# Patient Record
Sex: FEMALE | Race: WHITE | NOT HISPANIC OR LATINO | Employment: FULL TIME | RURAL
[De-identification: names, ages, dates, MRNs, and addresses within clinical notes are randomized per-mention and may not be internally consistent; named-entity substitution may affect disease eponyms.]

---

## 2020-10-08 ENCOUNTER — OFFICE VISIT (OUTPATIENT)
Dept: FAMILY MEDICINE CLINIC | Facility: CLINIC | Age: 40
End: 2020-10-08

## 2020-10-08 VITALS
DIASTOLIC BLOOD PRESSURE: 74 MMHG | HEART RATE: 72 BPM | RESPIRATION RATE: 18 BRPM | TEMPERATURE: 98.1 F | OXYGEN SATURATION: 100 % | SYSTOLIC BLOOD PRESSURE: 121 MMHG | WEIGHT: 163 LBS | HEIGHT: 65 IN | BODY MASS INDEX: 27.16 KG/M2

## 2020-10-08 DIAGNOSIS — E53.8 VITAMIN B12 DEFICIENCY: ICD-10-CM

## 2020-10-08 DIAGNOSIS — Z13.29 SCREENING FOR HYPOTHYROIDISM: ICD-10-CM

## 2020-10-08 DIAGNOSIS — Z30.09 ENCOUNTER FOR COUNSELING REGARDING CONTRACEPTION: ICD-10-CM

## 2020-10-08 DIAGNOSIS — E66.9 OBESITY WITHOUT SERIOUS COMORBIDITY, UNSPECIFIED CLASSIFICATION, UNSPECIFIED OBESITY TYPE: ICD-10-CM

## 2020-10-08 DIAGNOSIS — E55.9 VITAMIN D DEFICIENCY: ICD-10-CM

## 2020-10-08 DIAGNOSIS — Z13.220 SCREENING FOR HYPERLIPIDEMIA: ICD-10-CM

## 2020-10-08 DIAGNOSIS — K64.8 OTHER HEMORRHOIDS: Primary | ICD-10-CM

## 2020-10-08 DIAGNOSIS — N39.45 CONTINUOUS LEAKAGE OF URINE: ICD-10-CM

## 2020-10-08 PROCEDURE — 99204 OFFICE O/P NEW MOD 45 MIN: CPT | Performed by: FAMILY MEDICINE

## 2020-10-08 RX ORDER — OXYBUTYNIN CHLORIDE 5 MG/1
5 TABLET ORAL 2 TIMES DAILY
Qty: 60 TABLET | Refills: 2 | Status: SHIPPED | OUTPATIENT
Start: 2020-10-08 | End: 2021-03-01

## 2020-10-08 RX ORDER — HYDROCORTISONE ACETATE PRAMOXINE HCL 2.5; 1 G/100G; G/100G
CREAM TOPICAL 3 TIMES DAILY
Qty: 1 EACH | Refills: 0 | Status: SHIPPED | OUTPATIENT
Start: 2020-10-08 | End: 2020-10-19 | Stop reason: SDUPTHER

## 2020-10-08 NOTE — ASSESSMENT & PLAN NOTE
Patient was advised to try sitz bath and Hydrocort-pramoxine.  Patient was advised to return to clinic if her symptoms do not improve, consider colorectal surgery referral.

## 2020-10-08 NOTE — ASSESSMENT & PLAN NOTE
Obesity is worsening.  Discussed the patient's BMI.  The BMI is above average; BMI management plan is completed.  General weight loss/lifestyle modification strategies discussed (elicit support from others; identify saboteurs; non-food rewards, etc).  Regular aerobic exercise program discussed.

## 2020-10-08 NOTE — PROGRESS NOTES
Chief Complaint   Patient presents with   • Establish Care     New Patient   • Obesity     Discuss Gym Membership   • Contraception     Mirena- 6 Years Old       History of Present Illness:  Subjective   Ermelinda Zhang is a 40 y.o. female.   History of Present Illness   Establish Care:   Patient is here today to establish care, her last PCP visit was over 2 years ago. She currently takes no medications, has no documented health issues in her chart.     Obesity:   Patient is concerned about her weight, she is able to get a 6 month gym membership through her insurance and would like to do this. Her current BMI is 27.55. Her goal weight is 145-150 pounds. She feels that if she joins the gym and gets into a routine, she would be able to get her weight down which she feels would help with her energy and overall mood. Patient is wondering if she can still do ab workouts even if she has an umbilical hernia.     Contraception:   Patient has a Mirena IUD that was placed about 6 years ago. She is ready to have this removed and wants to talk about options for birth control. She knows she can't remember to take a pill everyday, she doesn't want the shot due to weight gain. She is trying to convince her  to have a vasectomy but he is resistant to that idea. Therefore, she would liek to discuss other options.     Hemorrhoids:   Patient states that she has had inflamed hemorrhoids for over 6 years that flare up on occasion and cause her to have a lot of bloody stool and cause pain and discomfort. She wanted it to be mentioned so that she had it documented in her medical chart.     Schedule Physical:   Patient would like to return to the office for a physical examination including a pap, mammogram and to have labs drawn today in order to review at the physical. She hasn't ever had a mammogram- her last pap was 6 years ago when her IUD was placed.     Allergies:  No Known Allergies    Social History:  Social History      Socioeconomic History   • Marital status:      Spouse name: Not on file   • Number of children: Not on file   • Years of education: Not on file   • Highest education level: Not on file       Family History:  No family history on file.    Past Medical History :  Active Ambulatory Problems     Diagnosis Date Noted   • Continuous leakage of urine 10/08/2020   • Encounter for counseling regarding contraception 10/08/2020   • Other hemorrhoids 10/08/2020   • Obesity without serious comorbidity 10/08/2020     Resolved Ambulatory Problems     Diagnosis Date Noted   • No Resolved Ambulatory Problems     No Additional Past Medical History       Medication List:  Outpatient Encounter Medications as of 10/8/2020   Medication Sig Dispense Refill   • Hydrocort-Pramoxine, Perianal, (ANALPRAM-HC) 2.5-1 % rectal cream Insert  into the rectum 3 (Three) Times a Day. 2-4 times daily, as needed or after each Bowel Movement 1 each 0   • oxybutynin (DITROPAN) 5 MG tablet Take 1 tablet by mouth 2 (Two) Times a Day. 60 tablet 2     No facility-administered encounter medications on file as of 10/8/2020.        Past Surgical History:  No past surgical history on file.     The following portions of the patient's history were reviewed and updated as appropriate: allergies, current medications, past family history, past medical history, past social history, past surgical history and problem list.    Review Of Systems:  Review of Systems   Constitutional: Negative for activity change, appetite change and fatigue.   HENT: Negative for ear discharge, ear pain, postnasal drip and rhinorrhea.    Eyes: Negative for double vision and discharge.   Respiratory: Negative for cough, chest tightness and shortness of breath.    Cardiovascular: Negative for chest pain.   Endocrine: Negative for cold intolerance and heat intolerance.   Genitourinary: Positive for urinary incontinence.        Hemorrhoids.   Musculoskeletal: Negative for back pain,  "gait problem and joint swelling.   Skin: Negative for color change and rash.   Neurological: Negative for dizziness, facial asymmetry and confusion.   Psychiatric/Behavioral: Negative for behavioral problems.       Objective     Physical Exam:  Vital Signs:  Visit Vitals  /74   Pulse 72   Temp 98.1 °F (36.7 °C)   Resp 18   Ht 163.8 cm (64.5\")   Wt 73.9 kg (163 lb)   SpO2 100%   BMI 27.55 kg/m²       Physical Exam  Vitals signs reviewed.   Constitutional:       Appearance: She is well-developed.   HENT:      Head: Normocephalic.      Right Ear: External ear normal.      Left Ear: External ear normal.      Nose: Nose normal.   Eyes:      Conjunctiva/sclera: Conjunctivae normal.   Neck:      Musculoskeletal: Normal range of motion and neck supple.   Cardiovascular:      Rate and Rhythm: Normal rate and regular rhythm.   Pulmonary:      Effort: Pulmonary effort is normal.      Breath sounds: Normal breath sounds.   Musculoskeletal: Normal range of motion.   Skin:     General: Skin is warm and dry.      Capillary Refill: Capillary refill takes less than 2 seconds.   Neurological:      Mental Status: She is alert and oriented to person, place, and time.           Assessment/Plan   Assessment and Plan:  Diagnoses and all orders for this visit:    1. Other hemorrhoids (Primary)  Assessment & Plan:  Patient was advised to try sitz bath and Hydrocort-pramoxine.  Patient was advised to return to clinic if her symptoms do not improve, consider colorectal surgery referral.    Orders:  -     Hydrocort-Pramoxine, Perianal, (ANALPRAM-HC) 2.5-1 % rectal cream; Insert  into the rectum 3 (Three) Times a Day. 2-4 times daily, as needed or after each Bowel Movement  Dispense: 1 each; Refill: 0    2. Continuous leakage of urine  -     oxybutynin (DITROPAN) 5 MG tablet; Take 1 tablet by mouth 2 (Two) Times a Day.  Dispense: 60 tablet; Refill: 2    3. Obesity without serious comorbidity, unspecified classification, unspecified " obesity type  Assessment & Plan:  Obesity is worsening.  Discussed the patient's BMI.  The BMI is above average; BMI management plan is completed.  General weight loss/lifestyle modification strategies discussed (elicit support from others; identify saboteurs; non-food rewards, etc).  Regular aerobic exercise program discussed.        4. Vitamin D deficiency  -     Vitamin D 25 Hydroxy  -     Folate    5. Vitamin B12 deficiency  -     Vitamin B12    6. Screening for hypothyroidism  -     TSH    7. Screening for hyperlipidemia  -     CBC & Differential  -     Comprehensive Metabolic Panel  -     Lipid Panel With / Chol / HDL Ratio    8. Encounter for counseling regarding contraception

## 2020-10-13 LAB
25(OH)D3+25(OH)D2 SERPL-MCNC: 38.9 NG/ML (ref 30–100)
ALBUMIN SERPL-MCNC: 5.1 G/DL (ref 3.8–4.8)
ALBUMIN/GLOB SERPL: 2.4 {RATIO} (ref 1.2–2.2)
ALP SERPL-CCNC: 41 IU/L (ref 39–117)
ALT SERPL-CCNC: 43 IU/L (ref 0–32)
AST SERPL-CCNC: 27 IU/L (ref 0–40)
BASOPHILS # BLD AUTO: 0 X10E3/UL (ref 0–0.2)
BASOPHILS NFR BLD AUTO: 1 %
BILIRUB SERPL-MCNC: 0.4 MG/DL (ref 0–1.2)
BUN SERPL-MCNC: 9 MG/DL (ref 6–24)
BUN/CREAT SERPL: 13 (ref 9–23)
CALCIUM SERPL-MCNC: 9.4 MG/DL (ref 8.7–10.2)
CHLORIDE SERPL-SCNC: 104 MMOL/L (ref 96–106)
CHOLEST SERPL-MCNC: 157 MG/DL (ref 100–199)
CHOLEST/HDLC SERPL: 2.3 RATIO (ref 0–4.4)
CO2 SERPL-SCNC: 25 MMOL/L (ref 20–29)
CREAT SERPL-MCNC: 0.72 MG/DL (ref 0.57–1)
EOSINOPHIL # BLD AUTO: 0 X10E3/UL (ref 0–0.4)
EOSINOPHIL NFR BLD AUTO: 0 %
ERYTHROCYTE [DISTWIDTH] IN BLOOD BY AUTOMATED COUNT: 12.2 % (ref 11.7–15.4)
FOLATE SERPL-MCNC: >20 NG/ML
GLOBULIN SER CALC-MCNC: 2.1 G/DL (ref 1.5–4.5)
GLUCOSE SERPL-MCNC: 100 MG/DL (ref 65–99)
HCT VFR BLD AUTO: 44.9 % (ref 34–46.6)
HDLC SERPL-MCNC: 69 MG/DL
HGB BLD-MCNC: 15 G/DL (ref 11.1–15.9)
IMM GRANULOCYTES # BLD AUTO: 0 X10E3/UL (ref 0–0.1)
IMM GRANULOCYTES NFR BLD AUTO: 0 %
LDLC SERPL CALC-MCNC: 77 MG/DL (ref 0–99)
LYMPHOCYTES # BLD AUTO: 2.2 X10E3/UL (ref 0.7–3.1)
LYMPHOCYTES NFR BLD AUTO: 30 %
MCH RBC QN AUTO: 31.6 PG (ref 26.6–33)
MCHC RBC AUTO-ENTMCNC: 33.4 G/DL (ref 31.5–35.7)
MCV RBC AUTO: 95 FL (ref 79–97)
MONOCYTES # BLD AUTO: 0.5 X10E3/UL (ref 0.1–0.9)
MONOCYTES NFR BLD AUTO: 6 %
NEUTROPHILS # BLD AUTO: 4.7 X10E3/UL (ref 1.4–7)
NEUTROPHILS NFR BLD AUTO: 63 %
PLATELET # BLD AUTO: 307 X10E3/UL (ref 150–450)
POTASSIUM SERPL-SCNC: 4.4 MMOL/L (ref 3.5–5.2)
PROT SERPL-MCNC: 7.2 G/DL (ref 6–8.5)
RBC # BLD AUTO: 4.74 X10E6/UL (ref 3.77–5.28)
SODIUM SERPL-SCNC: 141 MMOL/L (ref 134–144)
TRIGL SERPL-MCNC: 50 MG/DL (ref 0–149)
TSH SERPL DL<=0.005 MIU/L-ACNC: 2.25 UIU/ML (ref 0.45–4.5)
VIT B12 SERPL-MCNC: 486 PG/ML (ref 232–1245)
VLDLC SERPL CALC-MCNC: 11 MG/DL (ref 5–40)
WBC # BLD AUTO: 7.5 X10E3/UL (ref 3.4–10.8)

## 2020-10-19 ENCOUNTER — OFFICE VISIT (OUTPATIENT)
Dept: FAMILY MEDICINE CLINIC | Facility: CLINIC | Age: 40
End: 2020-10-19

## 2020-10-19 VITALS
HEART RATE: 66 BPM | DIASTOLIC BLOOD PRESSURE: 81 MMHG | WEIGHT: 164.4 LBS | TEMPERATURE: 97.5 F | HEIGHT: 65 IN | BODY MASS INDEX: 27.39 KG/M2 | OXYGEN SATURATION: 98 % | RESPIRATION RATE: 18 BRPM | SYSTOLIC BLOOD PRESSURE: 125 MMHG

## 2020-10-19 DIAGNOSIS — Z00.00 PHYSICAL EXAM: Primary | ICD-10-CM

## 2020-10-19 DIAGNOSIS — Z12.31 ENCOUNTER FOR SCREENING MAMMOGRAM FOR BREAST CANCER: ICD-10-CM

## 2020-10-19 DIAGNOSIS — K64.8 OTHER HEMORRHOIDS: ICD-10-CM

## 2020-10-19 PROCEDURE — 99396 PREV VISIT EST AGE 40-64: CPT | Performed by: FAMILY MEDICINE

## 2020-10-19 RX ORDER — HYDROCORTISONE ACETATE PRAMOXINE HCL 2.5; 1 G/100G; G/100G
CREAM TOPICAL 3 TIMES DAILY
Qty: 1 EACH | Refills: 0 | Status: SHIPPED | OUTPATIENT
Start: 2020-10-19 | End: 2021-03-01

## 2020-10-19 NOTE — ASSESSMENT & PLAN NOTE
Discussed injury prevention, diet and exercise, safe sexual practices, and screening for common diseases. Encouraged use of sunscreen and seatbelts. Discussed timing of  cervical cancer screening. Encouraged monthly self-breast exams, yearly clinical breast exams, and discussed timing of mammograms. Avoidance of tobacco encouraged. Limitation or avoidance of alcohol encouraged. Recommend yearly dental and eye exams. Also discussed monitoring of blood pressure, lipids.

## 2020-10-19 NOTE — PROGRESS NOTES
Chief Complaint   Patient presents with   • Annual Exam       History of Present Illness:  Subjective    History of Present Illness   Ermelinda Zhang is a 40 y.o. female here for her annual physical with me. Ermelinda is here for coordination of medical care, to discuss health maintenance, disease prevention as well as to followup on medical problems. Patient has been followed by me 2020. Patient's last CPE was unknown. Activity level is moderate. Exercises 4 per week. Appetite is good. Feels well with few complaints. Energy level is fair and poor. Sleeps poorly.Patient has never had a mammogram but she states that she is wanting to get one scheduled. Patient is doing routine self skin exam monthly. Patient is doing routine self-breast exams not very often.     Allergies:  No Known Allergies    Social History:  Social History     Socioeconomic History   • Marital status:      Spouse name: Not on file   • Number of children: Not on file   • Years of education: Not on file   • Highest education level: Not on file       Family History:  History reviewed. No pertinent family history.    Past Medical History :  Active Ambulatory Problems     Diagnosis Date Noted   • Continuous leakage of urine 10/08/2020   • Encounter for counseling regarding contraception 10/08/2020   • Other hemorrhoids 10/08/2020   • Obesity without serious comorbidity 10/08/2020   • Physical exam 10/19/2020     Resolved Ambulatory Problems     Diagnosis Date Noted   • No Resolved Ambulatory Problems     No Additional Past Medical History       Medication List:  Outpatient Encounter Medications as of 10/19/2020   Medication Sig Dispense Refill   • Hydrocort-Pramoxine, Perianal, (ANALPRAM-HC) 2.5-1 % rectal cream Insert  into the rectum 3 (Three) Times a Day. 2-4 times daily, as needed or after each Bowel Movement 1 each 0   • oxybutynin (DITROPAN) 5 MG tablet Take 1 tablet by mouth 2 (Two) Times a Day. 60 tablet 2   • [DISCONTINUED]  "Hydrocort-Pramoxine, Perianal, (ANALPRAM-HC) 2.5-1 % rectal cream Insert  into the rectum 3 (Three) Times a Day. 2-4 times daily, as needed or after each Bowel Movement 1 each 0     No facility-administered encounter medications on file as of 10/19/2020.        Past Surgical History:  History reviewed. No pertinent surgical history.     The following portions of the patient's history were reviewed and updated as appropriate: allergies, current medications, past family history, past medical history, past social history, past surgical history and problem list.    Review Of Systems:  Review of Systems   Constitutional: Negative for activity change, appetite change and fatigue.   HENT: Negative for congestion, postnasal drip, sinus pressure and sore throat.    Eyes: Negative for blurred vision and itching.   Respiratory: Negative for cough, shortness of breath and wheezing.    Cardiovascular: Negative for chest pain.   Gastrointestinal: Negative for abdominal pain, constipation, nausea, vomiting and GERD.   Endocrine: Negative for cold intolerance and heat intolerance.   Genitourinary: Negative for difficulty urinating, dysuria and urinary incontinence.   Musculoskeletal: Negative for back pain, joint swelling and neck pain.   Skin: Negative for color change and rash.   Neurological: Negative for dizziness, speech difficulty, weakness and memory problem.   Psychiatric/Behavioral: Negative for behavioral problems, decreased concentration, suicidal ideas and depressed mood.       Objective     Physical Exam:  Vital Signs:  Visit Vitals  /81   Pulse 66   Temp 97.5 °F (36.4 °C)   Resp 18   Ht 163.8 cm (64.5\")   Wt 74.6 kg (164 lb 6.4 oz)   SpO2 98%   BMI 27.78 kg/m²       Physical Exam  Vitals signs reviewed. Exam conducted with a chaperone present.   Constitutional:       Appearance: She is well-developed.   HENT:      Head: Normocephalic and atraumatic.      Nose: Nose normal.   Eyes:      General: Lids are normal. "      Conjunctiva/sclera: Conjunctivae normal.      Pupils: Pupils are equal, round, and reactive to light.   Neck:      Musculoskeletal: Normal range of motion and neck supple.   Cardiovascular:      Rate and Rhythm: Normal rate and regular rhythm.      Pulses: Normal pulses.      Heart sounds: Normal heart sounds.   Pulmonary:      Effort: Pulmonary effort is normal. No respiratory distress.      Breath sounds: Normal breath sounds.   Chest:      Chest wall: No swelling or tenderness.      Breasts: Boone Score is 5.         Right: Normal. No inverted nipple, mass or nipple discharge.         Left: No inverted nipple, mass or nipple discharge.   Abdominal:      General: Bowel sounds are normal.      Palpations: Abdomen is soft.   Musculoskeletal: Normal range of motion.   Skin:     General: Skin is warm and dry.      Capillary Refill: Capillary refill takes less than 2 seconds.   Neurological:      Mental Status: She is alert and oriented to person, place, and time.   Psychiatric:         Behavior: Behavior normal.         Thought Content: Thought content normal.         Judgment: Judgment normal.           Assessment/Plan    Recent Results (from the past 1344 hour(s))   CBC & Differential    Collection Time: 10/12/20  9:12 AM    Specimen: Blood   Result Value Ref Range    WBC 7.5 3.4 - 10.8 x10E3/uL    RBC 4.74 3.77 - 5.28 x10E6/uL    Hemoglobin 15.0 11.1 - 15.9 g/dL    Hematocrit 44.9 34.0 - 46.6 %    MCV 95 79 - 97 fL    MCH 31.6 26.6 - 33.0 pg    MCHC 33.4 31.5 - 35.7 g/dL    RDW 12.2 11.7 - 15.4 %    Platelets 307 150 - 450 x10E3/uL    Neutrophil Rel % 63 Not Estab. %    Lymphocyte Rel % 30 Not Estab. %    Monocyte Rel % 6 Not Estab. %    Eosinophil Rel % 0 Not Estab. %    Basophil Rel % 1 Not Estab. %    Neutrophils Absolute 4.7 1.4 - 7.0 x10E3/uL    Lymphocytes Absolute 2.2 0.7 - 3.1 x10E3/uL    Monocytes Absolute 0.5 0.1 - 0.9 x10E3/uL    Eosinophils Absolute 0.0 0.0 - 0.4 x10E3/uL    Basophils Absolute 0.0  0.0 - 0.2 x10E3/uL    Immature Granulocyte Rel % 0 Not Estab. %    Immature Grans Absolute 0.0 0.0 - 0.1 x10E3/uL   Comprehensive Metabolic Panel    Collection Time: 10/12/20  9:12 AM    Specimen: Blood   Result Value Ref Range    Glucose 100 (H) 65 - 99 mg/dL    BUN 9 6 - 24 mg/dL    Creatinine 0.72 0.57 - 1.00 mg/dL    eGFR Non African Am 104 >59 mL/min/1.73    eGFR African Am 119 >59 mL/min/1.73    BUN/Creatinine Ratio 13 9 - 23    Sodium 141 134 - 144 mmol/L    Potassium 4.4 3.5 - 5.2 mmol/L    Chloride 104 96 - 106 mmol/L    Total CO2 25 20 - 29 mmol/L    Calcium 9.4 8.7 - 10.2 mg/dL    Total Protein 7.2 6.0 - 8.5 g/dL    Albumin 5.1 (H) 3.8 - 4.8 g/dL    Globulin 2.1 1.5 - 4.5 g/dL    A/G Ratio 2.4 (H) 1.2 - 2.2    Total Bilirubin 0.4 0.0 - 1.2 mg/dL    Alkaline Phosphatase 41 39 - 117 IU/L    AST (SGOT) 27 0 - 40 IU/L    ALT (SGPT) 43 (H) 0 - 32 IU/L   Lipid Panel With / Chol / HDL Ratio    Collection Time: 10/12/20  9:12 AM    Specimen: Blood   Result Value Ref Range    Total Cholesterol 157 100 - 199 mg/dL    Triglycerides 50 0 - 149 mg/dL    HDL Cholesterol 69 >39 mg/dL    VLDL Cholesterol Humberto 11 5 - 40 mg/dL    LDL Chol Calc (NIH) 77 0 - 99 mg/dL    Chol/HDL Ratio 2.3 0.0 - 4.4 ratio   TSH    Collection Time: 10/12/20  9:12 AM    Specimen: Blood   Result Value Ref Range    TSH 2.250 0.450 - 4.500 uIU/mL   Vitamin D 25 Hydroxy    Collection Time: 10/12/20  9:12 AM    Specimen: Blood   Result Value Ref Range    25 Hydroxy, Vitamin D 38.9 30.0 - 100.0 ng/mL   Vitamin B12    Collection Time: 10/12/20  9:12 AM    Specimen: Blood   Result Value Ref Range    Vitamin B-12 486 232 - 1,245 pg/mL   Folate    Collection Time: 10/12/20  9:12 AM    Specimen: Blood   Result Value Ref Range    Folate >20.0 >3.0 ng/mL     Assessment and Plan:  Diagnoses and all orders for this visit:    1. Physical exam (Primary)  Assessment & Plan:  Discussed injury prevention, diet and exercise, safe sexual practices, and screening for  common diseases. Encouraged use of sunscreen and seatbelts. Discussed timing of  cervical cancer screening. Encouraged monthly self-breast exams, yearly clinical breast exams, and discussed timing of mammograms. Avoidance of tobacco encouraged. Limitation or avoidance of alcohol encouraged. Recommend yearly dental and eye exams. Also discussed monitoring of blood pressure, lipids.      2. Encounter for screening mammogram for breast cancer  -     Mammo Screening Digital Tomosynthesis Bilateral With CAD; Future    3. Other hemorrhoids  -     Hydrocort-Pramoxine, Perianal, (ANALPRAM-HC) 2.5-1 % rectal cream; Insert  into the rectum 3 (Three) Times a Day. 2-4 times daily, as needed or after each Bowel Movement  Dispense: 1 each; Refill: 0

## 2020-11-09 ENCOUNTER — HOSPITAL ENCOUNTER (OUTPATIENT)
Dept: MAMMOGRAPHY | Facility: HOSPITAL | Age: 40
Discharge: HOME | End: 2020-11-09
Admitting: FAMILY MEDICINE

## 2020-11-09 DIAGNOSIS — Z12.31 ENCOUNTER FOR SCREENING MAMMOGRAM FOR BREAST CANCER: ICD-10-CM

## 2020-11-09 PROCEDURE — 77067 SCR MAMMO BI INCL CAD: CPT

## 2020-11-09 PROCEDURE — 77063 BREAST TOMOSYNTHESIS BI: CPT

## 2020-11-13 ENCOUNTER — HOSPITAL ENCOUNTER (OUTPATIENT)
Dept: MAMMOGRAPHY | Facility: HOSPITAL | Age: 40
Discharge: HOME | End: 2020-11-13
Admitting: FAMILY MEDICINE

## 2020-11-13 DIAGNOSIS — R92.8 ABNORMALITY OF LEFT BREAST ON SCREENING MAMMOGRAM: ICD-10-CM

## 2020-11-13 PROCEDURE — G0279 TOMOSYNTHESIS, MAMMO: HCPCS

## 2020-11-13 PROCEDURE — 77065 DX MAMMO INCL CAD UNI: CPT

## 2020-11-16 ENCOUNTER — APPOINTMENT (OUTPATIENT)
Dept: MAMMOGRAPHY | Facility: HOSPITAL | Age: 40
End: 2020-11-16

## 2021-03-01 ENCOUNTER — OFFICE VISIT (OUTPATIENT)
Dept: FAMILY MEDICINE CLINIC | Facility: CLINIC | Age: 41
End: 2021-03-01

## 2021-03-01 VITALS
OXYGEN SATURATION: 100 % | SYSTOLIC BLOOD PRESSURE: 141 MMHG | HEIGHT: 65 IN | DIASTOLIC BLOOD PRESSURE: 98 MMHG | HEART RATE: 85 BPM | WEIGHT: 165 LBS | TEMPERATURE: 98 F | RESPIRATION RATE: 18 BRPM | BODY MASS INDEX: 27.49 KG/M2

## 2021-03-01 DIAGNOSIS — F32.A ANXIETY AND DEPRESSION: Primary | ICD-10-CM

## 2021-03-01 DIAGNOSIS — Z30.09 ENCOUNTER FOR COUNSELING REGARDING CONTRACEPTION: ICD-10-CM

## 2021-03-01 DIAGNOSIS — F41.9 ANXIETY AND DEPRESSION: Primary | ICD-10-CM

## 2021-03-01 PROCEDURE — 99214 OFFICE O/P EST MOD 30 MIN: CPT | Performed by: FAMILY MEDICINE

## 2021-03-01 RX ORDER — NORETHINDRONE ACETATE AND ETHINYL ESTRADIOL 1; .02 MG/1; MG/1
1 TABLET ORAL DAILY
Qty: 28 TABLET | Refills: 12 | Status: SHIPPED | OUTPATIENT
Start: 2021-03-01 | End: 2021-12-10

## 2021-03-01 NOTE — PROGRESS NOTES
"Chief Complaint  Anxiety (due to death in the family) and Contraception (discuss options)    Subjective    History of Present Illness        Ermelinda Zhang presents to [facility] for   History of Present Illness   Anxiety:   Patient is here today to discuss her anxiety and depression. She recently lost her mother and doesn't have many answers to go off of as to why. She is aware her mother had been using illicit substances again but the  ruled her death natural causes. She is starting therapy on Monday @ noon.     Birth Control Options:   Patient currently had an IUD and she has had this for 7 years. She doesn't really want to get it  Replaced because her periods are  irratic and sometimes she has 2-3 periods a month or sometimes she will skip altogether.     Objective   Vital Signs:   Visit Vitals  /98   Pulse 85   Temp 98 °F (36.7 °C)   Resp 18   Ht 163.8 cm (64.5\")   Wt 74.8 kg (165 lb)   SpO2 100%   BMI 27.88 kg/m²       Physical Exam  Vitals signs reviewed.   Constitutional:       Appearance: She is well-developed.   HENT:      Head: Normocephalic.      Right Ear: External ear normal.      Left Ear: External ear normal.      Nose: Nose normal.   Eyes:      Conjunctiva/sclera: Conjunctivae normal.   Neck:      Musculoskeletal: Normal range of motion and neck supple.   Cardiovascular:      Rate and Rhythm: Normal rate and regular rhythm.   Pulmonary:      Effort: Pulmonary effort is normal.      Breath sounds: Normal breath sounds.   Musculoskeletal: Normal range of motion.   Skin:     General: Skin is warm and dry.      Capillary Refill: Capillary refill takes less than 2 seconds.   Neurological:      Mental Status: She is alert and oriented to person, place, and time.            Result Review :                    Assessment and Plan      Diagnoses and all orders for this visit:    1. Anxiety and depression (Primary)  Assessment & Plan:  Patient symptoms are " worsening.  Discussed medications to help treat symptoms but patient declined.  Patient was advised to monitor symptoms and if they worsen return to the clinic.  We will consider Wellbutrin to treat her symptoms.      2. Encounter for counseling regarding contraception  Assessment & Plan:  Patient was started on Loestrin for birth control.  Patient was encouraged to return the clinic if she has any problems with this medication.    Orders:  -     norethindrone-ethinyl estradiol (Loestrin 1/20, 21,) 1-20 MG-MCG per tablet; Take 1 tablet by mouth Daily.  Dispense: 28 tablet; Refill: 12           Follow Up   No follow-ups on file.  Patient was given instructions and counseling regarding her condition or for health maintenance advice. Please see specific information pulled into the AVS if appropriate.

## 2021-03-03 PROBLEM — F41.9 ANXIETY AND DEPRESSION: Status: ACTIVE | Noted: 2021-03-03

## 2021-03-03 PROBLEM — F32.A ANXIETY AND DEPRESSION: Status: ACTIVE | Noted: 2021-03-03

## 2021-03-04 NOTE — ASSESSMENT & PLAN NOTE
Patient symptoms are worsening.  Discussed medications to help treat symptoms but patient declined.  Patient was advised to monitor symptoms and if they worsen return to the clinic.  We will consider Wellbutrin to treat her symptoms.

## 2021-03-04 NOTE — ASSESSMENT & PLAN NOTE
Patient was started on Loestrin for birth control.  Patient was encouraged to return the clinic if she has any problems with this medication.

## 2021-03-16 ENCOUNTER — TELEPHONE (OUTPATIENT)
Dept: FAMILY MEDICINE CLINIC | Facility: CLINIC | Age: 41
End: 2021-03-16

## 2021-03-16 NOTE — TELEPHONE ENCOUNTER
PATIENT CALLED AND STATED THAT SHE HAS BEEN HAVING ELEVATED BLOOD PRESSURE, RED EARS, AND HEADACHES. TODAY BLOOD PRESSURE /76. THE PATIENT STATED THAT OVER THE PAST TWO DAYS SHE WAS TAKING HER BLOOD PRESSURE WITH A WRIST CUFF AND THE BOTTOM NUMBER WAS ELEVATED FROM 90'S - 100. TODAY'S BP WAS TAKE WITH A NEW ARM CUFF MONITOR.    THE PATIENT ALSO STATED THAT SHE HAS STARTED DRINKING GREEN SMOOTHIES WITH BEET POWDER, TRYING TO BRING BLOOD PRESSURE DOWN NATURALLY. THE PATIENT STATED THAT SHE HAS AN APPT SCHEDULED FOR 3/22, WITH DR. PABLO TO GET HER IUD TAKEN OUT, AND WANTED TO KNOW IF IT WOULD BE OKAY TO WAIT AND DISCUSS HER BP WITH HIM THEN, OR IF SHE NEEDS TO MAKE AN APPT TO COME IN SOONER.    THE PATIENT IS REQUESTING A CALLBACK:    [phone]

## 2021-04-21 ENCOUNTER — TELEPHONE (OUTPATIENT)
Dept: PEDIATRICS | Facility: OTHER | Age: 41
End: 2021-04-21

## 2021-05-24 ENCOUNTER — PROCEDURE VISIT (OUTPATIENT)
Dept: FAMILY MEDICINE CLINIC | Facility: CLINIC | Age: 41
End: 2021-05-24

## 2021-05-24 VITALS
SYSTOLIC BLOOD PRESSURE: 134 MMHG | BODY MASS INDEX: 27.96 KG/M2 | OXYGEN SATURATION: 98 % | HEIGHT: 65 IN | TEMPERATURE: 97 F | HEART RATE: 77 BPM | DIASTOLIC BLOOD PRESSURE: 89 MMHG | WEIGHT: 167.8 LBS | RESPIRATION RATE: 18 BRPM

## 2021-05-24 DIAGNOSIS — F41.1 ANXIETY IN ACUTE STRESS REACTION: ICD-10-CM

## 2021-05-24 DIAGNOSIS — Z30.09 COUNSELING FOR BIRTH CONTROL REGARDING INTRAUTERINE DEVICE (IUD): Primary | ICD-10-CM

## 2021-05-24 DIAGNOSIS — F43.0 ANXIETY IN ACUTE STRESS REACTION: ICD-10-CM

## 2021-05-24 PROCEDURE — 99213 OFFICE O/P EST LOW 20 MIN: CPT | Performed by: FAMILY MEDICINE

## 2021-05-24 RX ORDER — LORAZEPAM 1 MG/1
1 TABLET ORAL EVERY 8 HOURS PRN
Qty: 10 TABLET | Refills: 0 | Status: SHIPPED | OUTPATIENT
Start: 2021-05-24 | End: 2021-12-10

## 2021-05-24 RX ORDER — CLONAZEPAM 1 MG/1
1 TABLET ORAL
Qty: 5 TABLET | Refills: 0 | Status: CANCELLED | OUTPATIENT
Start: 2021-05-24

## 2021-05-24 NOTE — PROGRESS NOTES
Subjective   Ermelinda Zhang is a 40 y.o. female.   No chief complaint on file.    Patient comes in today to have IUD taken out, however patient stated that we have had to change her appt. And she is now spotting and wants IUD replaced, because her  has stated that he is not having a vasectomy.       The following portions of the patient's history were reviewed and updated as appropriate: allergies, current medications, past family history, past medical history, past social history, past surgical history and problem list.    History reviewed. No pertinent past medical history.    History reviewed. No pertinent surgical history.     Family History   Problem Relation Age of Onset   • Breast cancer Paternal Grandmother    • Ovarian cancer Neg Hx         Social History     Socioeconomic History   • Marital status:      Spouse name: Not on file   • Number of children: Not on file   • Years of education: Not on file   • Highest education level: Not on file   Tobacco Use   • Smoking status: Former Smoker     Quit date: 10/13/2015     Years since quittin.6   • Smokeless tobacco: Never Used   Substance and Sexual Activity   • Alcohol use: Never   • Drug use: Never   • Sexual activity: Yes     Partners: Male     Birth control/protection: I.U.D.         Review of Systems   Constitutional: Negative for activity change, appetite change and fatigue.   HENT: Negative for ear discharge, ear pain, postnasal drip and rhinorrhea.    Eyes: Negative for double vision and discharge.   Respiratory: Negative for cough, chest tightness and shortness of breath.    Cardiovascular: Negative for chest pain.   Endocrine: Negative for cold intolerance and heat intolerance.   Musculoskeletal: Negative for back pain, gait problem and joint swelling.   Skin: Negative for color change and rash.   Neurological: Negative for dizziness, facial asymmetry and confusion.   Psychiatric/Behavioral: Negative for behavioral problems.  "      Objective   Visit Vitals  /89 (BP Location: Right arm, Patient Position: Sitting, Cuff Size: Adult)   Pulse 77   Temp 97 °F (36.1 °C) (Temporal)   Resp 18   Ht 163.8 cm (64.5\")   Wt 76.1 kg (167 lb 12.8 oz)   SpO2 98%   BMI 28.36 kg/m²     Physical Exam  Vitals reviewed.   Constitutional:       Appearance: She is well-developed.   HENT:      Head: Normocephalic.      Right Ear: External ear normal.      Left Ear: External ear normal.      Nose: Nose normal.   Eyes:      Conjunctiva/sclera: Conjunctivae normal.   Cardiovascular:      Rate and Rhythm: Normal rate and regular rhythm.   Pulmonary:      Effort: Pulmonary effort is normal.      Breath sounds: Normal breath sounds.   Musculoskeletal:         General: Normal range of motion.      Cervical back: Normal range of motion and neck supple.   Skin:     General: Skin is warm and dry.      Capillary Refill: Capillary refill takes less than 2 seconds.   Neurological:      Mental Status: She is alert and oriented to person, place, and time.         Assessment/Plan   Problem List Items Addressed This Visit        Genitourinary and Reproductive     Counseling for birth control regarding intrauterine device (IUD) - Primary    Current Assessment & Plan     Patient is to have her IUD removed and replacement IUD placed.  Patient's IUD was ordered and was sent to his clinic and we will schedule her for her procedure            Mental Health    Anxiety in acute stress reaction    Current Assessment & Plan     Patient was prescribed Ativan to help with the acute stress of having the IUD placed.  Patient was advised to take the Ativan day of the procedure.         Relevant Medications    LORazepam (Ativan) 1 MG tablet                   "

## 2021-05-27 PROBLEM — F41.1 ANXIETY IN ACUTE STRESS REACTION: Status: ACTIVE | Noted: 2021-05-27

## 2021-05-27 PROBLEM — F43.0 ANXIETY IN ACUTE STRESS REACTION: Status: ACTIVE | Noted: 2021-05-27

## 2021-05-27 NOTE — ASSESSMENT & PLAN NOTE
Patient was prescribed Ativan to help with the acute stress of having the IUD placed.  Patient was advised to take the Ativan day of the procedure.

## 2021-05-27 NOTE — ASSESSMENT & PLAN NOTE
Patient is to have her IUD removed and replacement IUD placed.  Patient's IUD was ordered and was sent to his clinic and we will schedule her for her procedure

## 2021-07-07 ENCOUNTER — TELEPHONE (OUTPATIENT)
Dept: FAMILY MEDICINE CLINIC | Facility: CLINIC | Age: 41
End: 2021-07-07

## 2021-07-07 NOTE — TELEPHONE ENCOUNTER
Caller: Ermelinda Zhang    Relationship: Self    Best call back number: [phone]    What is the best time to reach you: ANYTIME - WOULD PREFER TEXT WHILE SHE IS AT WORK.     Who are you requesting to speak with (clinical staff, provider,  specific staff member): PCP OR MEDICAL ASSISTANT    What was the call regarding: THE PATIENT WOULD LIKE TO KNOW IF HER IUD IS IN.    Do you require a callback: YES, PLEASE

## 2021-07-12 NOTE — TELEPHONE ENCOUNTER
I have spoken to this patient and we have sent this order form as of today- I told her I wasn't sure if they would approve it or not.

## 2021-07-15 ENCOUNTER — TELEPHONE (OUTPATIENT)
Dept: FAMILY MEDICINE CLINIC | Facility: CLINIC | Age: 41
End: 2021-07-15

## 2021-07-15 NOTE — TELEPHONE ENCOUNTER
[facility] called stating they had been  Trying to reach patient regarding shipment of Mirena IUD. They have been unsuccessful in reaching the patient. I called, spoke to patient and gave her the telephone number to reach [facility].

## 2021-12-10 ENCOUNTER — OFFICE VISIT (OUTPATIENT)
Dept: FAMILY MEDICINE CLINIC | Facility: CLINIC | Age: 41
End: 2021-12-10

## 2021-12-10 VITALS
WEIGHT: 160.6 LBS | RESPIRATION RATE: 18 BRPM | SYSTOLIC BLOOD PRESSURE: 124 MMHG | DIASTOLIC BLOOD PRESSURE: 62 MMHG | HEIGHT: 65 IN | BODY MASS INDEX: 26.76 KG/M2 | OXYGEN SATURATION: 98 % | TEMPERATURE: 97.5 F | HEART RATE: 67 BPM

## 2021-12-10 DIAGNOSIS — M79.605 LEFT LEG PAIN: ICD-10-CM

## 2021-12-10 DIAGNOSIS — K42.9 UMBILICAL HERNIA WITHOUT OBSTRUCTION AND WITHOUT GANGRENE: ICD-10-CM

## 2021-12-10 DIAGNOSIS — F41.9 ANXIETY: Primary | ICD-10-CM

## 2021-12-10 PROBLEM — F43.0 ANXIETY IN ACUTE STRESS REACTION: Status: RESOLVED | Noted: 2021-05-27 | Resolved: 2021-12-10

## 2021-12-10 PROBLEM — F32.A ANXIETY AND DEPRESSION: Status: RESOLVED | Noted: 2021-03-03 | Resolved: 2021-12-10

## 2021-12-10 PROBLEM — F41.1 ANXIETY IN ACUTE STRESS REACTION: Status: RESOLVED | Noted: 2021-05-27 | Resolved: 2021-12-10

## 2021-12-10 PROCEDURE — 99214 OFFICE O/P EST MOD 30 MIN: CPT | Performed by: FAMILY MEDICINE

## 2021-12-10 RX ORDER — ESCITALOPRAM OXALATE 10 MG/1
10 TABLET ORAL DAILY
Qty: 30 TABLET | Refills: 1 | Status: SHIPPED | OUTPATIENT
Start: 2021-12-10 | End: 2022-02-09

## 2021-12-10 NOTE — ASSESSMENT & PLAN NOTE
Good social support, no suicidal or homicidal ideation. Diagnosis and treatment discussed. Discussed counseling. Discussed medication, dosing and adverse effects. Return in 4 weeks

## 2021-12-10 NOTE — PROGRESS NOTES
Subjective   Ermelinda Zhang is a 41 y.o. female. Presents to [facility]    Chief Complaint   Patient presents with   • Hernia     umbilical   • Anxiety   • Leg Pain     left       Abdominal Pain  This is a new (medical history of umbilical herna) problem. Episode onset: 1 month. The onset quality is gradual. The problem occurs daily. The problem has been waxing and waning. The pain is located in the periumbilical region. Pain scale: varies. The pain is mild (to moderate). The quality of the pain is aching and tearing. The abdominal pain does not radiate. Pertinent negatives include no arthralgias, diarrhea, fever, frequency, nausea or vomiting. The pain is aggravated by movement. The pain is relieved by movement. She has tried nothing for the symptoms.   Leg Pain   Incident onset: x 5 months. There was no injury mechanism. Pain location: left lower leg. The quality of the pain is described as aching. Pain scale: varies. Pain severity now: varies. The pain has been intermittent since onset. Pertinent negatives include no numbness or tingling. She reports no foreign bodies present. Exacerbated by: shaving, fabric rubbing against skin. She has tried NSAIDs for the symptoms. The treatment provided moderate relief.   Anxiety  Presents for follow-up visit. Symptoms include excessive worry, irritability and nervous/anxious behavior. Patient reports no chest pain, depressed mood, nausea, shortness of breath or suicidal ideas. The severity of symptoms is moderate. The quality of sleep is fair.        She can not finish a task before starting another. This is occurring at work and it is stressing her out.   Her mom  a year ago    She had an umbilical hernia when she was pregnant. When working out months ago, it hurt. It went away until a 4 - 6 weeks ago, it started to hurt. It aches almost daily.     I personally reviewed and updated the patient's allergies, medications, problem list, and past medical,  surgical, social, and family history. I have reviewed and confirmed the accuracy of the History of Present Illness and Review of Symptoms as documented by the MA/LPN/RN. Karen Dee MD    Allergies:  No Known Allergies    Social History:  Social History     Socioeconomic History   • Marital status:    Tobacco Use   • Smoking status: Former Smoker     Quit date: 10/13/2015     Years since quittin.1   • Smokeless tobacco: Never Used   Substance and Sexual Activity   • Alcohol use: Never   • Drug use: Never   • Sexual activity: Yes     Partners: Male     Birth control/protection: I.U.D.       Family History:  Family History   Problem Relation Age of Onset   • Breast cancer Paternal Grandmother    • Ovarian cancer Neg Hx        Past Medical History :  Patient Active Problem List   Diagnosis   • Continuous leakage of urine   • Counseling for birth control regarding intrauterine device (IUD)   • Other hemorrhoids   • Obesity without serious comorbidity   • Physical exam   • Anxiety   • Umbilical hernia without obstruction and without gangrene   • Left leg pain       Medication List:    Current Outpatient Medications:   •  escitalopram (LEXAPRO) 10 MG tablet, Take 1 tablet by mouth Daily., Disp: 30 tablet, Rfl: 1    Past Surgical History:  History reviewed. No pertinent surgical history.    Review of Systems:  Review of Systems   Constitutional: Positive for irritability. Negative for activity change and fever.   HENT: Negative for ear pain, rhinorrhea, sinus pressure and voice change.    Eyes: Negative for visual disturbance.   Respiratory: Negative for cough and shortness of breath.    Cardiovascular: Negative for chest pain.   Gastrointestinal: Positive for abdominal pain. Negative for diarrhea, nausea and vomiting.   Endocrine: Negative for cold intolerance and heat intolerance.   Genitourinary: Negative for frequency and urgency.   Musculoskeletal: Negative for arthralgias.   Skin: Negative for rash.  "  Neurological: Negative for tingling, syncope and numbness.   Hematological: Does not bruise/bleed easily.   Psychiatric/Behavioral: Negative for suicidal ideas and depressed mood. The patient is nervous/anxious.        Physical Exam:  Vital Signs:  Vital Signs:   /62   Pulse 67   Temp 97.5 °F (36.4 °C) (Skin)   Resp 18   Ht 163.8 cm (64.5\")   Wt 72.8 kg (160 lb 9.6 oz)   SpO2 98%   BMI 27.14 kg/m²     Result Review :                Physical Exam  Vitals reviewed.   Constitutional:       Appearance: Normal appearance. She is well-developed.   HENT:      Head: Normocephalic and atraumatic.   Eyes:      General:         Right eye: No discharge.         Left eye: No discharge.   Cardiovascular:      Rate and Rhythm: Normal rate and regular rhythm.      Heart sounds: Normal heart sounds. No murmur heard.  No friction rub. No gallop.    Pulmonary:      Effort: Pulmonary effort is normal. No respiratory distress.      Breath sounds: Normal breath sounds. No wheezing or rales.   Abdominal:      General: Abdomen is flat. Bowel sounds are normal. There is no distension.      Palpations: Abdomen is soft. There is no mass.      Tenderness: There is no guarding or rebound.      Hernia: A hernia is present. Hernia is present in the umbilical area.      Comments: Small umbilical hernia, reducible, tender   Skin:     General: Skin is warm and dry.      Findings: No rash.   Neurological:      Mental Status: She is alert and oriented to person, place, and time.      Coordination: Coordination normal.      Gait: Gait normal.   Psychiatric:         Behavior: Behavior is cooperative.         Assessment and Plan:  Problems Addressed this Visit        Gastrointestinal Abdominal     Umbilical hernia without obstruction and without gangrene     Becoming symptomatic. Will have her see a surgeon.             Mental Health    Anxiety - Primary     Good social support, no suicidal or homicidal ideation. Diagnosis and treatment " discussed. Discussed counseling. Discussed medication, dosing and adverse effects. Return in 4 weeks           Relevant Medications    escitalopram (LEXAPRO) 10 MG tablet       Musculoskeletal and Injuries    Left leg pain     Mild nerve pain with shaving. May be inflammation from the knee? Exam is negative. Try a B complex, see if it helps.   Can try anti-inflammatory.            Diagnoses       Codes Comments    Anxiety    -  Primary ICD-10-CM: F41.9  ICD-9-CM: 300.00     Umbilical hernia without obstruction and without gangrene     ICD-10-CM: K42.9  ICD-9-CM: 553.1     Left leg pain     ICD-10-CM: M79.605  ICD-9-CM: 729.5            An After Visit Summary and PPPS were given to the patient.       I wore protective equipment throughout this patient encounter to include mask. Hand hygiene was performed before donning protective equipment and after removal when leaving the room.

## 2021-12-10 NOTE — ASSESSMENT & PLAN NOTE
Mild nerve pain with shaving. May be inflammation from the knee? Exam is negative. Try a B complex, see if it helps.   Can try anti-inflammatory.

## 2021-12-16 DIAGNOSIS — K42.9 UMBILICAL HERNIA WITHOUT OBSTRUCTION AND WITHOUT GANGRENE: Primary | ICD-10-CM

## 2022-01-07 ENCOUNTER — TELEPHONE (OUTPATIENT)
Dept: FAMILY MEDICINE CLINIC | Facility: CLINIC | Age: 42
End: 2022-01-07

## 2022-02-09 DIAGNOSIS — F41.9 ANXIETY: ICD-10-CM

## 2022-02-09 RX ORDER — ESCITALOPRAM OXALATE 10 MG/1
TABLET ORAL
Qty: 30 TABLET | Refills: 1 | Status: SHIPPED | OUTPATIENT
Start: 2022-02-09 | End: 2022-04-21

## 2022-04-20 ENCOUNTER — TELEPHONE (OUTPATIENT)
Dept: FAMILY MEDICINE CLINIC | Facility: CLINIC | Age: 42
End: 2022-04-20

## 2022-04-21 DIAGNOSIS — F41.9 ANXIETY: ICD-10-CM

## 2022-04-21 RX ORDER — ESCITALOPRAM OXALATE 10 MG/1
TABLET ORAL
Qty: 30 TABLET | Refills: 0 | Status: SHIPPED | OUTPATIENT
Start: 2022-04-21 | End: 2022-05-17

## 2022-05-17 DIAGNOSIS — F41.9 ANXIETY: ICD-10-CM

## 2022-05-17 RX ORDER — ESCITALOPRAM OXALATE 10 MG/1
TABLET ORAL
Qty: 30 TABLET | Refills: 0 | Status: SHIPPED | OUTPATIENT
Start: 2022-05-17

## 2022-06-06 ENCOUNTER — TELEPHONE (OUTPATIENT)
Dept: FAMILY MEDICINE CLINIC | Facility: CLINIC | Age: 42
End: 2022-06-06

## 2022-06-06 NOTE — TELEPHONE ENCOUNTER
Caller: Ermelinda Zhang    Relationship: Self    Best call back number: [phone]     What is the best time to reach you: ANY TIME    Who are you requesting to speak with (clinical staff, provider,  specific staff member): CLINICAL STAFFPAOLA    What was the call regarding: PATIENT CALLED IN REGARDS TO AN IUD THAT WAS SUPPOSED TO BE GETTING DELIVERED TO THE OFFICE. SINCE DR. PABLO HAS LEFT SHE HAS NOT HEARD ANYTHING ELSE ABOUT IT. SHE PREVIOUSLY SPOKE WITH PAOLA IN REGARDS TO THIS PRESCRIPTION AND THE DELIVERY OF THE MEDICATION.    PATIENT STATES IT IS OKAY TO SEND A MESSAGE ON [employer]    PLEASE ADVISE    Do you require a callback: YES

## 2023-10-28 NOTE — TELEPHONE ENCOUNTER
LVM - Called Patient to inform that they have been added to the Wait List for the Providers coming to our practice in the fall.  
Statement Selected

## 2024-07-03 ENCOUNTER — APPOINTMENT (OUTPATIENT)
Dept: CT IMAGING | Facility: HOSPITAL | Age: 44
End: 2024-07-03
Payer: COMMERCIAL

## 2024-07-03 ENCOUNTER — HOSPITAL ENCOUNTER (OUTPATIENT)
Facility: HOSPITAL | Age: 44
Setting detail: OBSERVATION
Discharge: HOME | End: 2024-07-04
Attending: EMERGENCY MEDICINE | Admitting: EMERGENCY MEDICINE
Payer: COMMERCIAL

## 2024-07-03 ENCOUNTER — APPOINTMENT (OUTPATIENT)
Dept: GENERAL RADIOLOGY | Facility: HOSPITAL | Age: 44
End: 2024-07-03
Payer: COMMERCIAL

## 2024-07-03 DIAGNOSIS — E86.0 DEHYDRATION: ICD-10-CM

## 2024-07-03 DIAGNOSIS — E87.0 HYPERNATREMIA: ICD-10-CM

## 2024-07-03 DIAGNOSIS — E87.6 HYPOKALEMIA: ICD-10-CM

## 2024-07-03 DIAGNOSIS — R20.2 PARESTHESIA: Primary | ICD-10-CM

## 2024-07-03 LAB
ALBUMIN SERPL-MCNC: 5.1 G/DL (ref 3.5–5.2)
ALBUMIN/GLOB SERPL: 1.9 G/DL
ALP SERPL-CCNC: 67 U/L (ref 39–117)
ALT SERPL W P-5'-P-CCNC: 21 U/L (ref 1–33)
AMPHET+METHAMPHET UR QL: NEGATIVE
ANION GAP SERPL CALCULATED.3IONS-SCNC: 26.6 MMOL/L (ref 5–15)
ANISOCYTOSIS BLD QL: ABNORMAL
AST SERPL-CCNC: 24 U/L (ref 1–32)
B-HCG UR QL: NEGATIVE
BACTERIA UR QL AUTO: ABNORMAL /HPF
BARBITURATES UR QL SCN: NEGATIVE
BASOPHILS # BLD MANUAL: 0.12 10*3/MM3 (ref 0–0.2)
BASOPHILS NFR BLD MANUAL: 1 % (ref 0–1.5)
BENZODIAZ UR QL SCN: NEGATIVE
BILIRUB SERPL-MCNC: 0.3 MG/DL (ref 0–1.2)
BILIRUB UR QL STRIP: NEGATIVE
BUN SERPL-MCNC: 7 MG/DL (ref 6–20)
BUN/CREAT SERPL: 10.1 (ref 7–25)
CALCIUM SPEC-SCNC: 10.1 MG/DL (ref 8.6–10.5)
CANNABINOIDS SERPL QL: POSITIVE
CHLORIDE SERPL-SCNC: 100 MMOL/L (ref 98–107)
CLARITY UR: ABNORMAL
CO2 SERPL-SCNC: 20.4 MMOL/L (ref 22–29)
COCAINE UR QL: NEGATIVE
COLOR UR: ABNORMAL
CREAT SERPL-MCNC: 0.69 MG/DL (ref 0.57–1)
DEPRECATED RDW RBC AUTO: 41.1 FL (ref 37–54)
EGFRCR SERPLBLD CKD-EPI 2021: 108.5 ML/MIN/1.73
ERYTHROCYTE [DISTWIDTH] IN BLOOD BY AUTOMATED COUNT: 11.9 % (ref 12.3–15.4)
GLOBULIN UR ELPH-MCNC: 2.7 GM/DL
GLUCOSE SERPL-MCNC: 130 MG/DL (ref 65–99)
GLUCOSE UR STRIP-MCNC: NEGATIVE MG/DL
HCT VFR BLD AUTO: 47.2 % (ref 34–46.6)
HGB BLD-MCNC: 15.7 G/DL (ref 12–15.9)
HGB UR QL STRIP.AUTO: NEGATIVE
HOLD SPECIMEN: NORMAL
HOLD SPECIMEN: NORMAL
KETONES UR QL STRIP: ABNORMAL
LEUKOCYTE ESTERASE UR QL STRIP.AUTO: ABNORMAL
LYMPHOCYTES # BLD MANUAL: 6.98 10*3/MM3 (ref 0.7–3.1)
LYMPHOCYTES NFR BLD MANUAL: 2 % (ref 5–12)
MCH RBC QN AUTO: 31.2 PG (ref 26.6–33)
MCHC RBC AUTO-ENTMCNC: 33.3 G/DL (ref 31.5–35.7)
MCV RBC AUTO: 93.7 FL (ref 79–97)
METHADONE UR QL SCN: NEGATIVE
MONOCYTES # BLD: 0.25 10*3/MM3 (ref 0.1–0.9)
NEUTROPHILS # BLD AUTO: 4.9 10*3/MM3 (ref 1.7–7)
NEUTROPHILS NFR BLD MANUAL: 40 % (ref 42.7–76)
NITRITE UR QL STRIP: NEGATIVE
OPIATES UR QL: NEGATIVE
OXYCODONE UR QL SCN: NEGATIVE
PH UR STRIP.AUTO: 7.5 [PH] (ref 5–8)
PLAT MORPH BLD: NORMAL
PLATELET # BLD AUTO: 352 10*3/MM3 (ref 140–450)
PMV BLD AUTO: 9.3 FL (ref 6–12)
POTASSIUM SERPL-SCNC: 3.2 MMOL/L (ref 3.5–5.2)
PROT SERPL-MCNC: 7.8 G/DL (ref 6–8.5)
PROT UR QL STRIP: ABNORMAL
RBC # BLD AUTO: 5.04 10*6/MM3 (ref 3.77–5.28)
RBC # UR STRIP: ABNORMAL /HPF
REF LAB TEST METHOD: ABNORMAL
SCAN SLIDE: NORMAL
SODIUM SERPL-SCNC: 147 MMOL/L (ref 136–145)
SP GR UR STRIP: 1.02 (ref 1–1.03)
SQUAMOUS #/AREA URNS HPF: ABNORMAL /HPF
TROPONIN T SERPL HS-MCNC: <6 NG/L
UNIDENT CRYS URNS QL MICRO: ABNORMAL /HPF
UROBILINOGEN UR QL STRIP: ABNORMAL
VARIANT LYMPHS NFR BLD MANUAL: 57 % (ref 19.6–45.3)
WBC # UR STRIP: ABNORMAL /HPF
WBC MORPH BLD: NORMAL
WBC NRBC COR # BLD AUTO: 12.25 10*3/MM3 (ref 3.4–10.8)
WHOLE BLOOD HOLD COAG: NORMAL
WHOLE BLOOD HOLD SPECIMEN: NORMAL

## 2024-07-03 PROCEDURE — G0378 HOSPITAL OBSERVATION PER HR: HCPCS

## 2024-07-03 PROCEDURE — 25810000003 SODIUM CHLORIDE 0.9 % SOLUTION: Performed by: NURSE PRACTITIONER

## 2024-07-03 PROCEDURE — 81025 URINE PREGNANCY TEST: CPT | Performed by: NURSE PRACTITIONER

## 2024-07-03 PROCEDURE — 80307 DRUG TEST PRSMV CHEM ANLYZR: CPT | Performed by: NURSE PRACTITIONER

## 2024-07-03 PROCEDURE — 72040 X-RAY EXAM NECK SPINE 2-3 VW: CPT

## 2024-07-03 PROCEDURE — 84484 ASSAY OF TROPONIN QUANT: CPT | Performed by: NURSE PRACTITIONER

## 2024-07-03 PROCEDURE — 81001 URINALYSIS AUTO W/SCOPE: CPT | Performed by: NURSE PRACTITIONER

## 2024-07-03 PROCEDURE — 85025 COMPLETE CBC W/AUTO DIFF WBC: CPT | Performed by: NURSE PRACTITIONER

## 2024-07-03 PROCEDURE — 80053 COMPREHEN METABOLIC PANEL: CPT | Performed by: NURSE PRACTITIONER

## 2024-07-03 PROCEDURE — 96360 HYDRATION IV INFUSION INIT: CPT

## 2024-07-03 PROCEDURE — 70450 CT HEAD/BRAIN W/O DYE: CPT

## 2024-07-03 PROCEDURE — 93005 ELECTROCARDIOGRAM TRACING: CPT

## 2024-07-03 PROCEDURE — 71045 X-RAY EXAM CHEST 1 VIEW: CPT

## 2024-07-03 PROCEDURE — 93005 ELECTROCARDIOGRAM TRACING: CPT | Performed by: EMERGENCY MEDICINE

## 2024-07-03 PROCEDURE — 85007 BL SMEAR W/DIFF WBC COUNT: CPT | Performed by: NURSE PRACTITIONER

## 2024-07-03 PROCEDURE — 99285 EMERGENCY DEPT VISIT HI MDM: CPT

## 2024-07-03 RX ORDER — ONDANSETRON 4 MG/1
4 TABLET, ORALLY DISINTEGRATING ORAL EVERY 6 HOURS PRN
Status: DISCONTINUED | OUTPATIENT
Start: 2024-07-03 | End: 2024-07-04 | Stop reason: HOSPADM

## 2024-07-03 RX ORDER — SODIUM CHLORIDE 0.9 % (FLUSH) 0.9 %
10 SYRINGE (ML) INJECTION AS NEEDED
Status: DISCONTINUED | OUTPATIENT
Start: 2024-07-03 | End: 2024-07-04 | Stop reason: HOSPADM

## 2024-07-03 RX ORDER — ALPRAZOLAM 1 MG/1
1 TABLET ORAL ONCE
Status: DISCONTINUED | OUTPATIENT
Start: 2024-07-03 | End: 2024-07-04 | Stop reason: HOSPADM

## 2024-07-03 RX ORDER — ALUMINA, MAGNESIA, AND SIMETHICONE 2400; 2400; 240 MG/30ML; MG/30ML; MG/30ML
15 SUSPENSION ORAL EVERY 6 HOURS PRN
Status: DISCONTINUED | OUTPATIENT
Start: 2024-07-03 | End: 2024-07-04 | Stop reason: HOSPADM

## 2024-07-03 RX ORDER — SODIUM CHLORIDE 9 MG/ML
40 INJECTION, SOLUTION INTRAVENOUS AS NEEDED
Status: DISCONTINUED | OUTPATIENT
Start: 2024-07-03 | End: 2024-07-04 | Stop reason: HOSPADM

## 2024-07-03 RX ORDER — BISACODYL 5 MG/1
5 TABLET, DELAYED RELEASE ORAL DAILY PRN
Status: DISCONTINUED | OUTPATIENT
Start: 2024-07-03 | End: 2024-07-04 | Stop reason: HOSPADM

## 2024-07-03 RX ORDER — AMLODIPINE BESYLATE 5 MG/1
5 TABLET ORAL DAILY
Status: DISCONTINUED | OUTPATIENT
Start: 2024-07-03 | End: 2024-07-04 | Stop reason: HOSPADM

## 2024-07-03 RX ORDER — AMOXICILLIN 250 MG
2 CAPSULE ORAL 2 TIMES DAILY PRN
Status: DISCONTINUED | OUTPATIENT
Start: 2024-07-03 | End: 2024-07-04 | Stop reason: HOSPADM

## 2024-07-03 RX ORDER — SODIUM CHLORIDE 0.9 % (FLUSH) 0.9 %
10 SYRINGE (ML) INJECTION EVERY 12 HOURS SCHEDULED
Status: DISCONTINUED | OUTPATIENT
Start: 2024-07-03 | End: 2024-07-04 | Stop reason: HOSPADM

## 2024-07-03 RX ORDER — ONDANSETRON 2 MG/ML
4 INJECTION INTRAMUSCULAR; INTRAVENOUS EVERY 6 HOURS PRN
Status: DISCONTINUED | OUTPATIENT
Start: 2024-07-03 | End: 2024-07-04 | Stop reason: HOSPADM

## 2024-07-03 RX ORDER — POLYETHYLENE GLYCOL 3350 17 G/17G
17 POWDER, FOR SOLUTION ORAL DAILY PRN
Status: DISCONTINUED | OUTPATIENT
Start: 2024-07-03 | End: 2024-07-04 | Stop reason: HOSPADM

## 2024-07-03 RX ORDER — BISACODYL 10 MG
10 SUPPOSITORY, RECTAL RECTAL DAILY PRN
Status: DISCONTINUED | OUTPATIENT
Start: 2024-07-03 | End: 2024-07-04 | Stop reason: HOSPADM

## 2024-07-03 RX ADMIN — AMLODIPINE BESYLATE 5 MG: 5 TABLET ORAL at 20:49

## 2024-07-03 RX ADMIN — SODIUM CHLORIDE 1000 ML: 9 INJECTION, SOLUTION INTRAVENOUS at 13:43

## 2024-07-03 RX ADMIN — Medication 10 ML: at 20:49

## 2024-07-03 NOTE — PLAN OF CARE
Problem: Adult Inpatient Plan of Care  Goal: Plan of Care Review  Outcome: Ongoing, Progressing  Goal: Patient-Specific Goal (Individualized)  Outcome: Ongoing, Progressing  Goal: Absence of Hospital-Acquired Illness or Injury  Outcome: Ongoing, Progressing  Goal: Optimal Comfort and Wellbeing  Outcome: Ongoing, Progressing  Goal: Readiness for Transition of Care  Outcome: Ongoing, Progressing     Problem: Pain Acute  Goal: Acceptable Pain Control and Functional Ability  Outcome: Ongoing, Progressing   Goal Outcome Evaluation:

## 2024-07-03 NOTE — ED PROVIDER NOTES
Subjective   History of Present Illness  44-year-old female with history of hypertension presents to the emergency room with complaint of right thumb numbness and tingling and facial numbness and tingling.  Patient states that it occurred about 10 minutes prior to arrival to the ER.  Patient states by the time she got here she had bilateral hand paresthesias.  Patient denies chest pain.  Patient does report some shortness of breath and nausea.  Patient states that she is prescribed amlodipine on a daily basis but she has forgotten her doses the last 2 or 3 nights.      Review of Systems   Constitutional:  Positive for activity change.   Respiratory:  Positive for shortness of breath. Negative for chest tightness.    Cardiovascular:  Negative for chest pain.   Skin:  Negative for rash and wound.   Neurological:  Positive for light-headedness and numbness.   Psychiatric/Behavioral:  Positive for agitation.        History reviewed. No pertinent past medical history.    Allergies   Allergen Reactions    Topiramate Other (See Comments)     Metallic taste in mouth       Past Surgical History:   Procedure Laterality Date    TUBAL ABDOMINAL LIGATION         Family History   Problem Relation Age of Onset    Breast cancer Paternal Grandmother     Ovarian cancer Neg Hx        Social History     Socioeconomic History    Marital status:    Tobacco Use    Smoking status: Former     Current packs/day: 0.00     Types: Cigarettes     Quit date: 10/13/2015     Years since quittin.7     Passive exposure: Never    Smokeless tobacco: Never   Vaping Use    Vaping status: Never Used   Substance and Sexual Activity    Alcohol use: Never    Drug use: Never    Sexual activity: Yes     Partners: Male     Birth control/protection: I.U.D.           Objective   Physical Exam  Constitutional:       Appearance: Normal appearance.   Neurological:      Mental Status: She is alert.   Psychiatric:         Attention and Perception: Attention  and perception normal.         Mood and Affect: Affect is tearful.         Speech: Speech is rapid and pressured.         Behavior: Behavior normal. Behavior is not agitated or aggressive. Behavior is cooperative.         Cognition and Memory: Cognition and memory normal.         Judgment: Judgment normal.         Procedures           ED Course      Medications   sodium chloride 0.9 % flush 10 mL (has no administration in time range)   ALPRAZolam (XANAX) tablet 1 mg (has no administration in time range)   sodium chloride 0.9 % bolus 1,000 mL (0 mL Intravenous Stopped 7/3/24 5793)                                 Labs Reviewed   URINE DRUG SCREEN - Abnormal; Notable for the following components:       Result Value    THC, Screen, Urine Positive (*)     All other components within normal limits    Narrative:     Negative Thresholds Per Drugs Screened:    Amphetamines                 500 ng/ml  Barbiturates                 200 ng/ml  Benzodiazepines              100 ng/ml  Cocaine                      300 ng/ml  Methadone                    300 ng/ml  Opiates                      300 ng/ml  Oxycodone                    100 ng/ml  THC                           50 ng/ml    The Normal Value for all drugs tested is negative. This report includes final unconfirmed screening results to be used for medical treatment purposes only. Unconfirmed results must not be used for non-medical purposes such as employment or legal testing. Clinical consideration should be applied to any drug of abuse test, particularly when unconfirmed results are used.          All urine drugs of abuse requests without chain of custody are for medical screening purposes only.  False positives are possible.     COMPREHENSIVE METABOLIC PANEL - Abnormal; Notable for the following components:    Glucose 130 (*)     Sodium 147 (*)     Potassium 3.2 (*)     CO2 20.4 (*)     Anion Gap 26.6 (*)     All other components within normal limits    Narrative:     GFR  Normal >60  Chronic Kidney Disease <60  Kidney Failure <15     URINALYSIS W/ CULTURE IF INDICATED - Abnormal; Notable for the following components:    Color, UA Dark Yellow (*)     Appearance, UA Hazy (*)     Ketones, UA 40 mg/dL (2+) (*)     Protein, UA 30 mg/dL (1+) (*)     Leuk Esterase, UA Trace (*)     All other components within normal limits    Narrative:     In absence of clinical symptoms, the presence of pyuria, bacteria, and/or nitrites on the urinalysis result does not correlate with infection.   CBC WITH AUTO DIFFERENTIAL - Abnormal; Notable for the following components:    WBC 12.25 (*)     Hematocrit 47.2 (*)     RDW 11.9 (*)     All other components within normal limits    Narrative:     The previously reported component NRBC is no longer being reported. Previous result was 0.0 /100 WBC (Reference Range: 0.0-0.2 /100 WBC) on 7/3/2024 at 1254 EDT.   MANUAL DIFFERENTIAL - Abnormal; Notable for the following components:    Neutrophil % 40.0 (*)     Lymphocyte % 57.0 (*)     Monocyte % 2.0 (*)     Lymphocytes Absolute 6.98 (*)     All other components within normal limits   URINALYSIS, MICROSCOPIC ONLY - Abnormal; Notable for the following components:    RBC, UA 3-5 (*)     WBC, UA 3-5 (*)     Bacteria, UA Trace (*)     Squamous Epithelial Cells, UA 3-6 (*)     All other components within normal limits   PREGNANCY, URINE - Normal   SINGLE HS TROPONIN T - Normal    Narrative:     High Sensitive Troponin T Reference Range:  <14.0 ng/L- Negative Female for AMI  <22.0 ng/L- Negative Male for AMI  >=14 - Abnormal Female indicating possible myocardial injury.  >=22 - Abnormal Male indicating possible myocardial injury.   Clinicians would have to utilize clinical acumen, EKG, Troponin, and serial changes to determine if it is an Acute Myocardial Infarction or myocardial injury due to an underlying chronic condition.        RAINBOW DRAW    Narrative:     The following orders were created for panel order [place]  Draw.  Procedure                               Abnormality         Status                     ---------                               -----------         ------                     Green Top (Gel)[[id number]]                                  Final result               Lavender Top[[id number]]                                     Final result               Gold Top - SST[[id number]]                                   Final result               Light Blue Top[[id number]]                                   Final result                 Please view results for these tests on the individual orders.   SCAN SLIDE   GREEN TOP   LAVENDER TOP   GOLD TOP - SST   LIGHT BLUE TOP   CBC AND DIFFERENTIAL    Narrative:     The following orders were created for panel order CBC & Differential.  Procedure                               Abnormality         Status                     ---------                               -----------         ------                     CBC Auto Differential[[id number]]        Abnormal            Final result               Scan Slide[[id number]]                                       Final result                 Please view results for these tests on the individual orders.            XR Chest 1 View    Result Date: 7/3/2024  Impression: Poorly inflated lungs. No active cardiopulmonary disease Electronically Signed: Jace Mcnair DO  7/3/2024 4:05 PM EDT  Workstation ID: [id number]    CT Head Without Contrast    Result Date: 7/3/2024  Impression: No acute intracranial abnormality. Electronically Signed: Lisa Adames MD  7/3/2024 2:18 PM EDT  Workstation ID: [id number]    Medical Decision Making  44-year-old  female presents to the emergency room with complaint of    Problems Addressed:  Dehydration: complicated acute illness or injury     Details: Urinalysis is significant for 2+ ketones.  1 L normal saline ordered and administered.  Hypernatremia: complicated acute illness or injury     Details: Sodium level  is 147.  Hypokalemia: complicated acute illness or injury     Details: Potassium level is 3.2  Paresthesia: complicated acute illness or injury     Details: Onset was 10 minutes prior to arrival to the ER.    Amount and/or Complexity of Data Reviewed  Labs: ordered.     Details: Electrolyte imbalance; potassium is 3.2 and sodium is 147.  Radiology: ordered.     Details: CT of head was ordered and was negative for acute.  ECG/medicine tests: ordered.     Details: EKG is significant for sinus tachycardia, rate of 127.  No ST elevation or depression.  Seen and signed by Dr Nehemias Baltazar.    Risk  Prescription drug management.  Decision regarding hospitalization.  Risk Details: Placed in ED OBS        Final diagnoses:   Paresthesia   Hypokalemia   Hypernatremia   Dehydration       ED Disposition  ED Disposition       ED Disposition   Decision to Admit    Condition   --    Comment   --               No follow-up provider specified.       Medication List      No changes were made to your prescriptions during this visit.            Jinny Martinez, APRN  07/03/24 160

## 2024-07-04 VITALS
TEMPERATURE: 99 F | HEART RATE: 89 BPM | HEIGHT: 64 IN | RESPIRATION RATE: 20 BRPM | BODY MASS INDEX: 32.44 KG/M2 | OXYGEN SATURATION: 99 % | WEIGHT: 190.04 LBS | DIASTOLIC BLOOD PRESSURE: 84 MMHG | SYSTOLIC BLOOD PRESSURE: 147 MMHG

## 2024-07-04 LAB
ANION GAP SERPL CALCULATED.3IONS-SCNC: 12.1 MMOL/L (ref 5–15)
BASOPHILS # BLD AUTO: 0.04 10*3/MM3 (ref 0–0.2)
BASOPHILS NFR BLD AUTO: 0.6 % (ref 0–1.5)
BUN SERPL-MCNC: 6 MG/DL (ref 6–20)
BUN/CREAT SERPL: 10.3 (ref 7–25)
CALCIUM SPEC-SCNC: 9.8 MG/DL (ref 8.6–10.5)
CHLORIDE SERPL-SCNC: 102 MMOL/L (ref 98–107)
CO2 SERPL-SCNC: 27.9 MMOL/L (ref 22–29)
CREAT SERPL-MCNC: 0.58 MG/DL (ref 0.57–1)
DEPRECATED RDW RBC AUTO: 40.4 FL (ref 37–54)
EGFRCR SERPLBLD CKD-EPI 2021: 113.2 ML/MIN/1.73
EOSINOPHIL # BLD AUTO: 0.05 10*3/MM3 (ref 0–0.4)
EOSINOPHIL NFR BLD AUTO: 0.7 % (ref 0.3–6.2)
ERYTHROCYTE [DISTWIDTH] IN BLOOD BY AUTOMATED COUNT: 11.6 % (ref 12.3–15.4)
GLUCOSE SERPL-MCNC: 100 MG/DL (ref 65–99)
HCT VFR BLD AUTO: 44.4 % (ref 34–46.6)
HGB BLD-MCNC: 15.1 G/DL (ref 12–15.9)
IMM GRANULOCYTES # BLD AUTO: 0.01 10*3/MM3 (ref 0–0.05)
IMM GRANULOCYTES NFR BLD AUTO: 0.1 % (ref 0–0.5)
LYMPHOCYTES # BLD AUTO: 2.36 10*3/MM3 (ref 0.7–3.1)
LYMPHOCYTES NFR BLD AUTO: 34.8 % (ref 19.6–45.3)
MCH RBC QN AUTO: 31.8 PG (ref 26.6–33)
MCHC RBC AUTO-ENTMCNC: 34 G/DL (ref 31.5–35.7)
MCV RBC AUTO: 93.5 FL (ref 79–97)
MONOCYTES # BLD AUTO: 0.47 10*3/MM3 (ref 0.1–0.9)
MONOCYTES NFR BLD AUTO: 6.9 % (ref 5–12)
NEUTROPHILS NFR BLD AUTO: 3.85 10*3/MM3 (ref 1.7–7)
NEUTROPHILS NFR BLD AUTO: 56.9 % (ref 42.7–76)
NRBC BLD AUTO-RTO: 0 /100 WBC (ref 0–0.2)
PLATELET # BLD AUTO: 274 10*3/MM3 (ref 140–450)
PMV BLD AUTO: 9.7 FL (ref 6–12)
POTASSIUM SERPL-SCNC: 3.3 MMOL/L (ref 3.5–5.2)
RBC # BLD AUTO: 4.75 10*6/MM3 (ref 3.77–5.28)
SODIUM SERPL-SCNC: 142 MMOL/L (ref 136–145)
WBC NRBC COR # BLD AUTO: 6.78 10*3/MM3 (ref 3.4–10.8)

## 2024-07-04 PROCEDURE — 80048 BASIC METABOLIC PNL TOTAL CA: CPT | Performed by: PHYSICIAN ASSISTANT

## 2024-07-04 PROCEDURE — G0378 HOSPITAL OBSERVATION PER HR: HCPCS

## 2024-07-04 PROCEDURE — 85025 COMPLETE CBC W/AUTO DIFF WBC: CPT | Performed by: PHYSICIAN ASSISTANT

## 2024-07-04 RX ORDER — METHYLPREDNISOLONE 4 MG/1
TABLET ORAL
Qty: 21 TABLET | Refills: 0 | Status: SHIPPED | OUTPATIENT
Start: 2024-07-04

## 2024-07-04 RX ORDER — AMLODIPINE BESYLATE 5 MG/1
10 TABLET ORAL DAILY
Qty: 30 TABLET | Refills: 0 | Status: SHIPPED | OUTPATIENT
Start: 2024-07-04

## 2024-07-04 RX ORDER — POTASSIUM CHLORIDE 20 MEQ/1
20 TABLET, EXTENDED RELEASE ORAL ONCE
Status: COMPLETED | OUTPATIENT
Start: 2024-07-04 | End: 2024-07-04

## 2024-07-04 RX ADMIN — AMLODIPINE BESYLATE 5 MG: 5 TABLET ORAL at 09:24

## 2024-07-04 RX ADMIN — POTASSIUM CHLORIDE 20 MEQ: 1500 TABLET, EXTENDED RELEASE ORAL at 09:24

## 2024-07-04 NOTE — PLAN OF CARE
Goal Outcome Evaluation:  Plan of Care Reviewed With: patient        Progress: improving  Outcome Evaluation: Patient back to baseline. Neuro assessment normal. Waiting on results of CT scan. Will monitor

## 2024-07-04 NOTE — PLAN OF CARE
Problem: Adult Inpatient Plan of Care  Goal: Plan of Care Review  Outcome: Met  Goal: Patient-Specific Goal (Individualized)  Outcome: Met  Goal: Absence of Hospital-Acquired Illness or Injury  Outcome: Met  Intervention: Identify and Manage Fall Risk  Recent Flowsheet Documentation  Taken 7/4/2024 0800 by Paxton Zhu RN  Safety Promotion/Fall Prevention: safety round/check completed  Intervention: Prevent Skin Injury  Recent Flowsheet Documentation  Taken 7/4/2024 0800 by Paxton Zhu RN  Body Position: position changed independently  Skin Protection:   adhesive use limited   skin-to-device areas padded   skin-to-skin areas padded   tubing/devices free from skin contact  Intervention: Prevent and Manage VTE (Venous Thromboembolism) Risk  Recent Flowsheet Documentation  Taken 7/4/2024 0800 by Paxton Zhu RN  Activity Management: up ad sven  VTE Prevention/Management: patient refused intervention  Range of Motion: active ROM (range of motion) encouraged  Intervention: Prevent Infection  Recent Flowsheet Documentation  Taken 7/4/2024 0800 by Paxton Zhu RN  Infection Prevention:   single patient room provided   rest/sleep promoted   hand hygiene promoted   personal protective equipment utilized  Goal: Optimal Comfort and Wellbeing  Outcome: Met  Intervention: Provide Person-Centered Care  Recent Flowsheet Documentation  Taken 7/4/2024 0800 by Paxton Zhu RN  Trust Relationship/Rapport:   care explained   choices provided   emotional support provided   empathic listening provided   questions answered   reassurance provided   questions encouraged   thoughts/feelings acknowledged  Goal: Readiness for Transition of Care  Outcome: Met   Goal Outcome Evaluation:      Pt to discharge home

## 2024-07-04 NOTE — CASE MANAGEMENT/SOCIAL WORK
Case Management Discharge Note      Final Note: Home               Transportation Services  Private: Car    Final Discharge Disposition Code: 01 - home or self-care

## 2024-07-04 NOTE — DISCHARGE SUMMARY
[facility]    Provider, No Known    CHIEF COMPLAINT:     Paraesthesias in luis hands    HISTORY OF PRESENT ILLNESS:    Illness  Associated symptoms include numbness.       ED  44-year-old female with history of hypertension presents to the emergency room with complaint of right thumb numbness and tingling and facial numbness and tingling. Patient states that it occurred about 10 minutes prior to arrival to the ER. Patient states by the time she got here she had bilateral hand paresthesias. Patient denies chest pain. Patient does report some shortness of breath and nausea. Patient states that she is prescribed amlodipine on a daily basis but she has forgotten her doses the last 2 or 3 nights.     History reviewed. No pertinent past medical history.  Past Surgical History:   Procedure Laterality Date    TUBAL ABDOMINAL LIGATION       Family History   Problem Relation Age of Onset    Breast cancer Paternal Grandmother     Ovarian cancer Neg Hx      Social History     Tobacco Use    Smoking status: Former     Current packs/day: 0.00     Types: Cigarettes     Quit date: 10/13/2015     Years since quittin.7     Passive exposure: Never    Smokeless tobacco: Never   Vaping Use    Vaping status: Never Used   Substance Use Topics    Alcohol use: Never    Drug use: Never     Medications Prior to Admission   Medication Sig Dispense Refill Last Dose    amLODIPine (NORVASC) 5 MG tablet Take 1 tablet by mouth Daily.   Past Week     Allergies:  Topiramate    Immunization History   Administered Date(s) Administered    COVID-19 (PFIZER) Purple Cap Monovalent 2021, 2021    Flu Vaccine Intradermal Quad 18-64YR 10/30/2015, 10/10/2019    Fluzone (or Fluarix & Flulaval for VFC) >6mos 10/30/2015    Hepatitis A 2023    Tdap 10/30/2015           REVIEW OF SYSTEMS:    Review of Systems   Musculoskeletal:         Muscle contractions in fingers and hand   Neurological:  Positive for numbness and  paresthesias.           Vital Signs  Temp:  [97.8 °F (36.6 °C)-99 °F (37.2 °C)] 99 °F (37.2 °C)  Heart Rate:  [] 89  Resp:  [18-24] 20  BP: (135-157)/(80-96) 147/84          Physical Exam:  Physical Exam  Constitutional:       Appearance: Normal appearance.   Cardiovascular:      Rate and Rhythm: Normal rate and regular rhythm.      Pulses: Normal pulses.      Heart sounds: Normal heart sounds.   Pulmonary:      Effort: Pulmonary effort is normal.      Breath sounds: Normal breath sounds.   Skin:     General: Skin is warm and dry.   Neurological:      General: No focal deficit present.      Mental Status: She is alert and oriented to person, place, and time.   Psychiatric:         Mood and Affect: Mood normal.         Behavior: Behavior normal.         Emotional Behavior:    wnl   Debilities:   None      Results Review:    I reviewed the patient's new clinical results.  Lab Results (most recent)       Procedure Component Value Units Date/Time    Basic Metabolic Panel [[id number]]  (Abnormal) Collected: 07/04/24 0402    Specimen: Blood from Arm, Left Updated: 07/04/24 0610     Glucose 100 mg/dL      BUN 6 mg/dL      Creatinine 0.58 mg/dL      Sodium 142 mmol/L      Potassium 3.3 mmol/L      Chloride 102 mmol/L      CO2 27.9 mmol/L      Calcium 9.8 mg/dL      BUN/Creatinine Ratio 10.3     Anion Gap 12.1 mmol/L      eGFR 113.2 mL/min/1.73     Narrative:      GFR Normal >60  Chronic Kidney Disease <60  Kidney Failure <15      CBC & Differential [[id number]]  (Abnormal) Collected: 07/04/24 0402    Specimen: Blood from Arm, Left Updated: 07/04/24 0543    Narrative:      The following orders were created for panel order CBC & Differential.  Procedure                               Abnormality         Status                     ---------                               -----------         ------                     CBC Auto Differential[[id number]]        Abnormal            Final result                 Please view results  for these tests on the individual orders.    CBC Auto Differential [[id number]]  (Abnormal) Collected: 07/04/24 0402    Specimen: Blood from Arm, Left Updated: 07/04/24 0543     WBC 6.78 10*3/mm3      RBC 4.75 10*6/mm3      Hemoglobin 15.1 g/dL      Hematocrit 44.4 %      MCV 93.5 fL      MCH 31.8 pg      MCHC 34.0 g/dL      RDW 11.6 %      RDW-SD 40.4 fl      MPV 9.7 fL      Platelets 274 10*3/mm3      Neutrophil % 56.9 %      Lymphocyte % 34.8 %      Monocyte % 6.9 %      Eosinophil % 0.7 %      Basophil % 0.6 %      Immature Grans % 0.1 %      Neutrophils, Absolute 3.85 10*3/mm3      Lymphocytes, Absolute 2.36 10*3/mm3      Monocytes, Absolute 0.47 10*3/mm3      Eosinophils, Absolute 0.05 10*3/mm3      Basophils, Absolute 0.04 10*3/mm3      Immature Grans, Absolute 0.01 10*3/mm3      nRBC 0.0 /100 WBC     Pregnancy, Urine - Urine, Clean Catch [[id number]]  (Normal) Collected: 07/03/24 1303    Specimen: Urine, Clean Catch Updated: 07/03/24 1411     HCG, Urine QL Negative    Single High Sensitivity Troponin T [[id number]]  (Normal) Collected: 07/03/24 1142    Specimen: Blood Updated: 07/03/24 1347     HS Troponin T <6 ng/L     Narrative:      High Sensitive Troponin T Reference Range:  <14.0 ng/L- Negative Female for AMI  <22.0 ng/L- Negative Male for AMI  >=14 - Abnormal Female indicating possible myocardial injury.  >=22 - Abnormal Male indicating possible myocardial injury.   Clinicians would have to utilize clinical acumen, EKG, Troponin, and serial changes to determine if it is an Acute Myocardial Infarction or myocardial injury due to an underlying chronic condition.         Urine Drug Screen - Urine, Clean Catch [[id number]]  (Abnormal) Collected: 07/03/24 1303    Specimen: Urine, Clean Catch Updated: 07/03/24 1337     Amphet/Methamphet, Screen Negative     Barbiturates Screen, Urine Negative     Benzodiazepine Screen, Urine Negative     Cocaine Screen, Urine Negative     Opiate Screen Negative     THC, Screen,  Urine Positive     Methadone Screen, Urine Negative     Oxycodone Screen, Urine Negative    Narrative:      Negative Thresholds Per Drugs Screened:    Amphetamines                 500 ng/ml  Barbiturates                 200 ng/ml  Benzodiazepines              100 ng/ml  Cocaine                      300 ng/ml  Methadone                    300 ng/ml  Opiates                      300 ng/ml  Oxycodone                    100 ng/ml  THC                           50 ng/ml    The Normal Value for all drugs tested is negative. This report includes final unconfirmed screening results to be used for medical treatment purposes only. Unconfirmed results must not be used for non-medical purposes such as employment or legal testing. Clinical consideration should be applied to any drug of abuse test, particularly when unconfirmed results are used.          All urine drugs of abuse requests without chain of custody are for medical screening purposes only.  False positives are possible.      Urinalysis, Microscopic Only - Urine, Clean Catch [[id number]]  (Abnormal) Collected: 07/03/24 1303    Specimen: Urine, Clean Catch Updated: 07/03/24 1328     RBC, UA 3-5 /HPF      WBC, UA 3-5 /HPF      Comment: Urine culture not indicated.        Bacteria, UA Trace /HPF      Squamous Epithelial Cells, UA 3-6 /HPF      Amorphous Urate Crystals, UA Moderate/2+ /HPF      Methodology Manual Light Microscopy    Urinalysis With Culture If Indicated - Urine, Clean Catch [[id number]]  (Abnormal) Collected: 07/03/24 1303    Specimen: Urine, Clean Catch Updated: 07/03/24 1317     Color, UA Dark Yellow     Appearance, UA Hazy     pH, UA 7.5     Specific Gravity, UA 1.021     Glucose, UA Negative     Ketones, UA 40 mg/dL (2+)     Bilirubin, UA Negative     Blood, UA Negative     Protein, UA 30 mg/dL (1+)     Leuk Esterase, UA Trace     Nitrite, UA Negative     Urobilinogen, UA 1.0 E.U./dL    Narrative:      In absence of clinical symptoms, the presence of  pyuria, bacteria, and/or nitrites on the urinalysis result does not correlate with infection.    Comprehensive Metabolic Panel [[id number]]  (Abnormal) Collected: 07/03/24 1142    Specimen: Blood Updated: 07/03/24 1313     Glucose 130 mg/dL      BUN 7 mg/dL      Creatinine 0.69 mg/dL      Sodium 147 mmol/L      Potassium 3.2 mmol/L      Chloride 100 mmol/L      CO2 20.4 mmol/L      Calcium 10.1 mg/dL      Total Protein 7.8 g/dL      Albumin 5.1 g/dL      ALT (SGPT) 21 U/L      AST (SGOT) 24 U/L      Alkaline Phosphatase 67 U/L      Total Bilirubin 0.3 mg/dL      Globulin 2.7 gm/dL      A/G Ratio 1.9 g/dL      BUN/Creatinine Ratio 10.1     Anion Gap 26.6 mmol/L      eGFR 108.5 mL/min/1.73     Narrative:      GFR Normal >60  Chronic Kidney Disease <60  Kidney Failure <15      CBC & Differential [[id number]]  (Abnormal) Collected: 07/03/24 1142    Specimen: Blood Updated: 07/03/24 1312    Narrative:      The following orders were created for panel order CBC & Differential.  Procedure                               Abnormality         Status                     ---------                               -----------         ------                     CBC Auto Differential[[id number]]        Abnormal            Final result               Scan Slide[[id number]]                                       Final result                 Please view results for these tests on the individual orders.    CBC Auto Differential [[id number]]  (Abnormal) Collected: 07/03/24 1142    Specimen: Blood Updated: 07/03/24 1312     WBC 12.25 10*3/mm3      RBC 5.04 10*6/mm3      Hemoglobin 15.7 g/dL      Hematocrit 47.2 %      MCV 93.7 fL      MCH 31.2 pg      MCHC 33.3 g/dL      RDW 11.9 %      RDW-SD 41.1 fl      MPV 9.3 fL      Platelets 352 10*3/mm3     Narrative:      The previously reported component NRBC is no longer being reported. Previous result was 0.0 /100 WBC (Reference Range: 0.0-0.2 /100 WBC) on 7/3/2024 at 1254 EDT.    Scan Slide [[id number]]  Collected: 07/03/24 1142    Specimen: Blood Updated: 07/03/24 1312     Scan Slide --     Comment: See Manual Differential Results       Manual Differential [[id number]]  (Abnormal) Collected: 07/03/24 1142    Specimen: Blood Updated: 07/03/24 1312     Neutrophil % 40.0 %      Lymphocyte % 57.0 %      Monocyte % 2.0 %      Basophil % 1.0 %      Neutrophils Absolute 4.90 10*3/mm3      Lymphocytes Absolute 6.98 10*3/mm3      Monocytes Absolute 0.25 10*3/mm3      Basophils Absolute 0.12 10*3/mm3      Anisocytosis Slight/1+     WBC Morphology Normal     Platelet Morphology Normal    [place] Draw [[id number]] Collected: 07/03/24 1142    Specimen: Blood Updated: 07/03/24 1201    Narrative:      The following orders were created for panel order [place] Draw.  Procedure                               Abnormality         Status                     ---------                               -----------         ------                     Green Top (Gel)[[id number]]                                  Final result               Lavender Top[[id number]]                                     Final result               Gold Top - SST[[id number]]                                   Final result               Light Blue Top[[id number]]                                   Final result                 Please view results for these tests on the individual orders.    Green Top (Gel) [[id number]] Collected: 07/03/24 1142    Specimen: Blood Updated: 07/03/24 1201     Extra Tube Hold for add-ons.     Comment: Auto resulted.       Lavender Top [[id number]] Collected: 07/03/24 1142    Specimen: Blood Updated: 07/03/24 1201     Extra Tube hold for add-on     Comment: Auto resulted       Gold Top - SST [[id number]] Collected: 07/03/24 1142    Specimen: Blood Updated: 07/03/24 1201     Extra Tube Hold for add-ons.     Comment: Auto resulted.       Light Blue Top [[id number]] Collected: 07/03/24 1142    Specimen: Blood Updated: 07/03/24 1201     Extra Tube Hold for  add-ons.     Comment: Auto resulted               Imaging Results (Most Recent)       Procedure Component Value Units Date/Time    XR Spine Cervical 2 or 3 View [[id number]] Collected: 07/03/24 2138     Updated: 07/03/24 2143    Narrative:      XR SPINE CERVICAL 2 OR 3 VW    Date of Exam: 7/3/2024 6:06 PM EDT    Indication: numbness in hand    Comparison: None available.    Findings:  The vertebral body heights appear well-maintained. The relationship of the lateral pillars of C1 with respect to C2 does not appear abnormal. There is some facet arthropathy most pronounced on the left. The prevertebral soft tissues are grossly   unremarkable.      Impression:      Impression:  1.Evidence for some underlying degenerative change.      Electronically Signed: Norman Rouse MD    7/3/2024 9:41 PM EDT    Workstation ID: [id number]    XR Chest 1 View [[id number]] Collected: 07/03/24 1604     Updated: 07/03/24 1608    Narrative:      XR CHEST 1 VW    Date of Exam: 7/3/2024 1:48 PM EDT    Indication: chest pain hands went numb, difficulty speaking    Comparison: 12/23/2023    Findings:  The lungs are underinflated and grossly clear. Cardiac, hilar, and mediastinal silhouettes are within normal limits. No pneumothorax or pleural effusions. The trachea is midline. Pulmonary vascularity is normal. Visualized bony structures are intact.        Impression:      Impression:  Poorly inflated lungs. No active cardiopulmonary disease      Electronically Signed: Jace Mcnair DO    7/3/2024 4:05 PM EDT    Workstation ID: [id number]    CT Head Without Contrast [[id number]] Collected: 07/03/24 1415     Updated: 07/03/24 1420    Narrative:      CT HEAD WO CONTRAST    Date of Exam: 7/3/2024 2:10 PM EDT    Indication: paresthesias.    Comparison: None available.    Technique: Axial CT images were obtained of the head without contrast administration.  Coronal reconstructions were performed.  Automated exposure control and iterative reconstruction  methods were used.      Findings:  There is no evidence of acute territorial infarction.    There is no acute intracranial hemorrhage. There are no extra-axial collections.    Ventricles and CSF spaces are symmetric. No mass effect nor hydrocephalus.    Brain parenchyma appears normal for age.     Paranasal sinuses and mastoid air cells are adequately aerated.     Osseous structures and orbits appear intact.      Impression:      Impression:  No acute intracranial abnormality.      Electronically Signed: Lisa Adames MD    7/3/2024 2:18 PM EDT    Workstation ID: [id number]          reviewed    ECG/EMG Results (most recent)       Procedure Component Value Units Date/Time    ECG 12 Lead Rhythm Change [[id number]] Collected: 07/03/24 1134     Updated: 07/03/24 1136     QT Interval 303 ms      QTC Interval 441 ms     Narrative:      HEART DUWO=677  bpm  RR Xestfyoq=783  ms  KS Wavjzuin=691  ms  P Horizontal Axis=8  deg  P Front Axis=73  deg  QRSD Interval=82  ms  QT Krrydjfu=526  ms  OWwX=492  ms  QRS Axis=65  deg  T Wave Axis=-68  deg  - ABNORMAL ECG -  Sinus tachycardia  Nonspecific repol abnormality, diffuse leads  No previous ECG available for comparison  Date and Time of Study:2024-07-03 11:34:31    Telemetry Scan [[id number]] Resulted: 07/03/24     Updated: 07/03/24 1303          reviewed            Microbiology Results (last 10 days)       ** No results found for the last 240 hours. **            Assessment & Plan     Paresthesias     Paresthesias  - wbc 12 repeat 6  - potassium 3.2, 3.3 replacement given  - sodium 147, 142 today  - ua trace LE and wbc but some squamous cells present, no nitrites, pt is asymptomatic  - ct head reviewed and showed no acute intracranial process  - chest xray unremarkable  - cervical spine xray showed some degenerative changes  - EKG rate 127 sinus  - pt informed provider that she had just been on an 8 day cleanse with green smoothies. Describing paraesthesias as more muscle  contractions. Most likely due to electrolyte abnormalities.  - iv fluids given    Hypertension  -moderately Controlled   BP Readings from Last 1 Encounters:   24 147/84     - Continue norvasc  - will increase dosage and pt to follow up with pcp  - Monitor while admitted       I discussed the patients findings and my recommendations with patient and nursing staff.     Discharge Diagnosis:      Paresthesias      Hospital Course  Patient is a 44 y.o. female presented with paresthesias. Er evaluated and admitted to observation. Labs were altered including wbc, sodium and potassium. Iv fluids and potassium replaced. Wbc were most likely from dehydration due to an extreme diet the pt had been using over the last 8 days. Ct head, chest xray were normal. Cervical spine xray showing degenerative changes. Pt was slightly hypertensive and pt states she has been for awhile. Will increase dosage of norvasc to 10 and have pt follow up with pcp. Discharge discussed with pt and she is agreeable to plan. Instructed pt to return to er if symptoms reoccur or worsen.       Past Medical History:   History reviewed. No pertinent past medical history.    Past Surgical History:     Past Surgical History:   Procedure Laterality Date    TUBAL ABDOMINAL LIGATION         Social History:   Social History     Socioeconomic History    Marital status:    Tobacco Use    Smoking status: Former     Current packs/day: 0.00     Types: Cigarettes     Quit date: 10/13/2015     Years since quittin.7     Passive exposure: Never    Smokeless tobacco: Never   Vaping Use    Vaping status: Never Used   Substance and Sexual Activity    Alcohol use: Never    Drug use: Never    Sexual activity: Yes     Partners: Male     Birth control/protection: I.U.D.       Procedures Performed         Consults:   Consults       No orders found for last 30 day(s).            Condition on Discharge:     Stable    Discharge Disposition      Discharge  Medications     Discharge Medications        Continue These Medications        Instructions Start Date   amLODIPine 5 MG tablet  Commonly known as: NORVASC   1 tablet, Oral, Daily               Discharge Diet:     Activity at Discharge:     Follow-up Appointments  No future appointments.      Test Results Pending at Discharge       Risk for Readmission (LACE) Score: 1 (7/4/2024  6:00 AM)      Less Than 30 minutes spent in discharge activities for this patient    Signature:Electronically signed by Sravani Martinez PA-C, 07/04/24, 8:49 AM EDT.

## 2024-07-05 LAB
QT INTERVAL: 303 MS
QTC INTERVAL: 441 MS

## 2024-10-15 NOTE — PROGRESS NOTES
Chief Complaint  Establish Care, Annual Exam, Obesity (Poss weight loss medication ), Anxiety, and Hot Flashes (Ever since surgery )    Subjective        Ermelinda Zhang is here to establish care  History of Present Illness  Ermelinda is a 44 year old female presenting today to establish care and her annual exam. She has a couple concerns she would like to discuss today.    Hot Flashes:  Hx of recent bilateral salpingectomy.  Having hot flashes at night and during the day.  Not having periods.    Obesity:  Working on diet and exercise  Difficulty getting below 190 lbs  Interested in weight loss medications    Hx Hep C:  Tx with Epclusa x 3 months at [facility]  Labs show tx was successful    Bilateral Hearing loss:  Hx of frequent ear infections  Ear tubes x 4  Has been told she has a lot of scar tissue  Has a lot of trouble hearing  Would like to see ENT        Previous PCP: Dr. Lopez  Specialist:   Marital Status:   Children: 4 living; 4 miscarriages  Occupation: CNA  Exercise: trying to walk  Diet: balanced off/on  Tobacco use: denies  Alcohol use: rarely  Recreational drug use: THC occasionally    Routine Health Maintenance:  Dental: overdue  Vision: UTD  Colonoscopy: will order cologuard  Pap Smear: 2.5 years ago. normal  Mammogram: will order today. Hx of fibrocystic breast    Vaccines:  Influenza: will give today  COVID: UTD  Tdap: UTD      The following portions of the patient's history were reviewed and updated as appropriate: allergies, current medications, past family history, past medical history, past social history, past surgical history and problem list.    Allergies   Allergen Reactions    Topiramate Other (See Comments)     Metallic taste in mouth         Current Outpatient Medications:     amLODIPine (NORVASC) 5 MG tablet, Take 2 tablets by mouth Daily., Disp: 30 tablet, Rfl: 0    Fezolinetant (Veozah) 45 MG tablet, Take 1 tablet by mouth Daily., Disp: 30 tablet, Rfl: 0     "methylPREDNISolone (MEDROL) 4 MG dose pack, Take as directed on package instructions. (Patient not taking: Reported on 10/16/2024), Disp: 21 tablet, Rfl: 0    Family History   Problem Relation Age of Onset    Breast cancer Paternal Grandmother     Ovarian cancer Neg Hx         No past medical history on file.    Social History     Socioeconomic History    Marital status:    Tobacco Use    Smoking status: Former     Current packs/day: 0.00     Average packs/day: 0.3 packs/day for 20.0 years (5.0 ttl pk-yrs)     Types: Cigarettes     Start date: 1994     Quit date: 2014     Years since quitting: 10.7     Passive exposure: Never    Smokeless tobacco: Never   Vaping Use    Vaping status: Never Used   Substance and Sexual Activity    Alcohol use: Never    Drug use: Never    Sexual activity: Yes     Partners: Male     Birth control/protection: I.U.D.        Past Surgical History:   Procedure Laterality Date    TUBAL ABDOMINAL LIGATION          Patient Active Problem List   Diagnosis    Continuous leakage of urine    Counseling for birth control regarding intrauterine device (IUD)    Other hemorrhoids    Obesity without serious comorbidity    Physical exam    Anxiety    Umbilical hernia without obstruction and without gangrene    Left leg pain    Paresthesias       Immunization History   Administered Date(s) Administered    COVID-19 (PFIZER) Purple Cap Monovalent 06/01/2021, 06/29/2021    Flu Vaccine Intradermal Quad 18-64YR 10/30/2015, 10/10/2019    Fluzone  >6mos 10/16/2024    Fluzone (or Fluarix & Flulaval for VFC) >6mos 10/30/2015    Hepatitis A 11/21/2023    Tdap 10/30/2015         Objective   Vital Signs:  /87 (BP Location: Right arm, Patient Position: Sitting, Cuff Size: Large Adult)   Pulse 77   Temp 98.2 °F (36.8 °C) (Temporal)   Ht 162.6 cm (64\")   Wt 88.6 kg (195 lb 6.4 oz)   SpO2 100%   BMI 33.54 kg/m²   Estimated body mass index is 33.54 kg/m² as calculated from the following:    Height as " "of this encounter: 162.6 cm (64\").    Weight as of this encounter: 88.6 kg (195 lb 6.4 oz).             Review of Systems   Constitutional:  Positive for diaphoresis. Negative for activity change, appetite change, chills, fatigue, fever and unexpected weight change.   HENT:  Positive for hearing loss. Negative for congestion, rhinorrhea, sneezing and sore throat.    Eyes:  Negative for visual disturbance.   Respiratory:  Negative for cough, shortness of breath and wheezing.    Cardiovascular:  Negative for chest pain.   Gastrointestinal:  Negative for abdominal pain, constipation, diarrhea, nausea and vomiting.   Genitourinary:  Negative for difficulty urinating and dysuria.   Musculoskeletal:  Negative for arthralgias, gait problem and myalgias.   Skin:  Negative for color change, rash and wound.   Neurological:  Negative for dizziness, weakness, light-headedness, numbness and headaches.   Psychiatric/Behavioral:  Negative for self-injury, sleep disturbance and suicidal ideas. The patient is not nervous/anxious.      Physical Exam  Constitutional:       Appearance: Normal appearance.   HENT:      Head: Normocephalic and atraumatic.      Right Ear: Ear canal and external ear normal. Tympanic membrane is scarred.      Left Ear: Ear canal and external ear normal. Tympanic membrane is scarred.      Nose: Nose normal.      Mouth/Throat:      Mouth: Mucous membranes are moist.      Pharynx: Oropharynx is clear.   Eyes:      Extraocular Movements: Extraocular movements intact.      Conjunctiva/sclera: Conjunctivae normal.      Pupils: Pupils are equal, round, and reactive to light.   Cardiovascular:      Rate and Rhythm: Normal rate and regular rhythm.      Pulses: Normal pulses.      Heart sounds: Normal heart sounds.   Pulmonary:      Effort: Pulmonary effort is normal.      Breath sounds: Normal breath sounds.   Abdominal:      General: Abdomen is flat. Bowel sounds are normal.      Palpations: Abdomen is soft. "   Musculoskeletal:         General: Normal range of motion.      Cervical back: Normal range of motion and neck supple.   Skin:     General: Skin is warm and dry.      Capillary Refill: Capillary refill takes less than 2 seconds.   Neurological:      Mental Status: She is alert and oriented to person, place, and time.   Psychiatric:         Mood and Affect: Mood normal.         Behavior: Behavior normal.         Thought Content: Thought content normal.         Judgment: Judgment normal.        Result Review :                   Assessment and Plan   Diagnoses and all orders for this visit:    1. Preventative health care (Primary)  Comments:  Overall healthy 44 year old female.  Flu shot given  Mammogram and cologuard ordered  Labs ordered  F/u 1 year or sooner if needed  Orders:  -     Comprehensive Metabolic Panel  -     Hemoglobin A1c  -     Lipid Panel  -     T4, Free  -     TSH  -     Vitamin D,25-Hydroxy  -     CBC & Differential  -     Hepatitis C Antibody    2. Needs flu shot  -     Fluzone >6mos    3. Screen for colon cancer  -     Cologuard - Stool, Per Rectum; Future    4. Encounter for screening mammogram for malignant neoplasm of breast  -     Mammo Screening Digital Tomosynthesis Bilateral With CAD; Future    5. Bilateral hearing loss, unspecified hearing loss type  Comments:  Referral ENT  Orders:  -     Ambulatory Referral to ENT (Otolaryngology)    6. Hot flashes  Comments:  Start Veozah 45 mg daily  Patient given sample  Orders:  -     Fezolinetant (Veozah) 45 MG tablet; Take 1 tablet by mouth Daily.  Dispense: 30 tablet; Refill: 0             Follow Up   Return in about 1 year (around 10/16/2025) for Annual physical.  Patient was given instructions and counseling regarding her condition or for health maintenance advice. Please see specific information pulled into the AVS if appropriate.

## 2024-10-16 ENCOUNTER — OFFICE VISIT (OUTPATIENT)
Dept: FAMILY MEDICINE CLINIC | Facility: CLINIC | Age: 44
End: 2024-10-16
Payer: COMMERCIAL

## 2024-10-16 ENCOUNTER — LAB (OUTPATIENT)
Dept: LAB | Facility: HOSPITAL | Age: 44
End: 2024-10-16
Payer: COMMERCIAL

## 2024-10-16 VITALS
HEIGHT: 64 IN | BODY MASS INDEX: 33.36 KG/M2 | OXYGEN SATURATION: 100 % | DIASTOLIC BLOOD PRESSURE: 87 MMHG | TEMPERATURE: 98.2 F | WEIGHT: 195.4 LBS | HEART RATE: 77 BPM | SYSTOLIC BLOOD PRESSURE: 130 MMHG

## 2024-10-16 DIAGNOSIS — Z12.31 ENCOUNTER FOR SCREENING MAMMOGRAM FOR MALIGNANT NEOPLASM OF BREAST: ICD-10-CM

## 2024-10-16 DIAGNOSIS — R23.2 HOT FLASHES: ICD-10-CM

## 2024-10-16 DIAGNOSIS — Z12.11 SCREEN FOR COLON CANCER: ICD-10-CM

## 2024-10-16 DIAGNOSIS — H91.93 BILATERAL HEARING LOSS, UNSPECIFIED HEARING LOSS TYPE: ICD-10-CM

## 2024-10-16 DIAGNOSIS — Z23 NEEDS FLU SHOT: ICD-10-CM

## 2024-10-16 DIAGNOSIS — Z00.00 PREVENTATIVE HEALTH CARE: Primary | ICD-10-CM

## 2024-10-16 LAB
25(OH)D3 SERPL-MCNC: 35.5 NG/ML (ref 30–100)
ALBUMIN SERPL-MCNC: 4.7 G/DL (ref 3.5–5.2)
ALBUMIN/GLOB SERPL: 1.6 G/DL
ALP SERPL-CCNC: 66 U/L (ref 39–117)
ALT SERPL W P-5'-P-CCNC: 15 U/L (ref 1–33)
ANION GAP SERPL CALCULATED.3IONS-SCNC: 11.3 MMOL/L (ref 5–15)
AST SERPL-CCNC: 13 U/L (ref 1–32)
BASOPHILS # BLD AUTO: 0.02 10*3/MM3 (ref 0–0.2)
BASOPHILS NFR BLD AUTO: 0.4 % (ref 0–1.5)
BILIRUB SERPL-MCNC: 0.5 MG/DL (ref 0–1.2)
BUN SERPL-MCNC: 12 MG/DL (ref 6–20)
BUN/CREAT SERPL: 16 (ref 7–25)
CALCIUM SPEC-SCNC: 10 MG/DL (ref 8.6–10.5)
CHLORIDE SERPL-SCNC: 102 MMOL/L (ref 98–107)
CHOLEST SERPL-MCNC: 233 MG/DL (ref 0–200)
CO2 SERPL-SCNC: 28.7 MMOL/L (ref 22–29)
CREAT SERPL-MCNC: 0.75 MG/DL (ref 0.57–1)
DEPRECATED RDW RBC AUTO: 42.9 FL (ref 37–54)
EGFRCR SERPLBLD CKD-EPI 2021: 99.6 ML/MIN/1.73
EOSINOPHIL # BLD AUTO: 0.08 10*3/MM3 (ref 0–0.4)
EOSINOPHIL NFR BLD AUTO: 1.7 % (ref 0.3–6.2)
ERYTHROCYTE [DISTWIDTH] IN BLOOD BY AUTOMATED COUNT: 12.1 % (ref 12.3–15.4)
GLOBULIN UR ELPH-MCNC: 3 GM/DL
GLUCOSE SERPL-MCNC: 97 MG/DL (ref 65–99)
HBA1C MFR BLD: 5.3 % (ref 4.8–5.6)
HCT VFR BLD AUTO: 44.5 % (ref 34–46.6)
HCV AB SER QL: REACTIVE
HDLC SERPL-MCNC: 61 MG/DL (ref 40–60)
HGB BLD-MCNC: 14.7 G/DL (ref 12–15.9)
IMM GRANULOCYTES # BLD AUTO: 0.01 10*3/MM3 (ref 0–0.05)
IMM GRANULOCYTES NFR BLD AUTO: 0.2 % (ref 0–0.5)
LDLC SERPL CALC-MCNC: 142 MG/DL (ref 0–100)
LDLC/HDLC SERPL: 2.26 {RATIO}
LYMPHOCYTES # BLD AUTO: 2.03 10*3/MM3 (ref 0.7–3.1)
LYMPHOCYTES NFR BLD AUTO: 42.5 % (ref 19.6–45.3)
MCH RBC QN AUTO: 31.7 PG (ref 26.6–33)
MCHC RBC AUTO-ENTMCNC: 33 G/DL (ref 31.5–35.7)
MCV RBC AUTO: 96.1 FL (ref 79–97)
MONOCYTES # BLD AUTO: 0.53 10*3/MM3 (ref 0.1–0.9)
MONOCYTES NFR BLD AUTO: 11.1 % (ref 5–12)
NEUTROPHILS NFR BLD AUTO: 2.11 10*3/MM3 (ref 1.7–7)
NEUTROPHILS NFR BLD AUTO: 44.1 % (ref 42.7–76)
NRBC BLD AUTO-RTO: 0 /100 WBC (ref 0–0.2)
PLATELET # BLD AUTO: 307 10*3/MM3 (ref 140–450)
PMV BLD AUTO: 9.6 FL (ref 6–12)
POTASSIUM SERPL-SCNC: 3.9 MMOL/L (ref 3.5–5.2)
PROT SERPL-MCNC: 7.7 G/DL (ref 6–8.5)
RBC # BLD AUTO: 4.63 10*6/MM3 (ref 3.77–5.28)
SODIUM SERPL-SCNC: 142 MMOL/L (ref 136–145)
T4 FREE SERPL-MCNC: 1.17 NG/DL (ref 0.92–1.68)
TRIGL SERPL-MCNC: 171 MG/DL (ref 0–150)
TSH SERPL DL<=0.05 MIU/L-ACNC: 2.35 UIU/ML (ref 0.27–4.2)
VLDLC SERPL-MCNC: 30 MG/DL (ref 5–40)
WBC NRBC COR # BLD AUTO: 4.78 10*3/MM3 (ref 3.4–10.8)

## 2024-10-16 PROCEDURE — 99396 PREV VISIT EST AGE 40-64: CPT | Performed by: NURSE PRACTITIONER

## 2024-10-16 PROCEDURE — 82306 VITAMIN D 25 HYDROXY: CPT | Performed by: NURSE PRACTITIONER

## 2024-10-16 PROCEDURE — 99214 OFFICE O/P EST MOD 30 MIN: CPT | Performed by: NURSE PRACTITIONER

## 2024-10-16 PROCEDURE — 80053 COMPREHEN METABOLIC PANEL: CPT | Performed by: NURSE PRACTITIONER

## 2024-10-16 PROCEDURE — 36415 COLL VENOUS BLD VENIPUNCTURE: CPT | Performed by: NURSE PRACTITIONER

## 2024-10-16 PROCEDURE — 80061 LIPID PANEL: CPT | Performed by: NURSE PRACTITIONER

## 2024-10-16 PROCEDURE — 86803 HEPATITIS C AB TEST: CPT | Performed by: NURSE PRACTITIONER

## 2024-10-16 PROCEDURE — 84439 ASSAY OF FREE THYROXINE: CPT | Performed by: NURSE PRACTITIONER

## 2024-10-16 PROCEDURE — 83036 HEMOGLOBIN GLYCOSYLATED A1C: CPT | Performed by: NURSE PRACTITIONER

## 2024-10-16 PROCEDURE — 85025 COMPLETE CBC W/AUTO DIFF WBC: CPT | Performed by: NURSE PRACTITIONER

## 2024-10-16 PROCEDURE — 90656 IIV3 VACC NO PRSV 0.5 ML IM: CPT | Performed by: NURSE PRACTITIONER

## 2024-10-16 PROCEDURE — 84443 ASSAY THYROID STIM HORMONE: CPT | Performed by: NURSE PRACTITIONER

## 2024-10-16 PROCEDURE — 90471 IMMUNIZATION ADMIN: CPT | Performed by: NURSE PRACTITIONER

## 2024-10-16 RX ORDER — FEZOLINETANT 45 MG/1
45 TABLET, FILM COATED ORAL EVERY 24 HOURS
Qty: 30 TABLET | Refills: 0 | Status: SHIPPED | OUTPATIENT
Start: 2024-10-16

## 2024-12-03 ENCOUNTER — OFFICE VISIT (OUTPATIENT)
Dept: FAMILY MEDICINE CLINIC | Facility: CLINIC | Age: 44
End: 2024-12-03
Payer: COMMERCIAL

## 2024-12-03 VITALS
BODY MASS INDEX: 34.66 KG/M2 | DIASTOLIC BLOOD PRESSURE: 93 MMHG | TEMPERATURE: 98.9 F | HEART RATE: 76 BPM | HEIGHT: 64 IN | OXYGEN SATURATION: 98 % | SYSTOLIC BLOOD PRESSURE: 138 MMHG | WEIGHT: 203 LBS

## 2024-12-03 DIAGNOSIS — M25.551 RIGHT HIP PAIN: ICD-10-CM

## 2024-12-03 DIAGNOSIS — M25.521 RIGHT ELBOW PAIN: Primary | ICD-10-CM

## 2024-12-03 PROCEDURE — 99214 OFFICE O/P EST MOD 30 MIN: CPT | Performed by: NURSE PRACTITIONER

## 2024-12-03 RX ORDER — METHYLPREDNISOLONE 4 MG/1
TABLET ORAL
Qty: 21 TABLET | Refills: 0 | Status: SHIPPED | OUTPATIENT
Start: 2024-12-03

## 2024-12-03 NOTE — PROGRESS NOTES
Chief Complaint  Elbow Injury (Right elbow Can't reach arm out or  with right hand without pain ) and Hip Pain (Right hip Can't cross right leg over left or lay on right side and can't lift leg up /)    Subjective        Ermelinda Zhang presents to [facility]  History of Present Illness  Ermelinda is a 44-year-old female presenting today with complaints of right elbow and right hip pain.    Right elbow pain:   Recurrent issue.  Has seen orthopedics in the past and was diagnosed with tennis elbow.  Her symptoms started about 1.5 weeks ago.  Difficulty picking things up and she is reaching for them.  Reports pain with movement.  Ibuprofen and Tylenol are not helping.    Right hip pain:  Recurrent issue.  Has a history of back problems and was receiving epidural shots.  6-7 months ago she developed bilateral hip tenderness.  Recently her cat tripped her and she fell on her left shoulder, so she has been sleeping on her right side for the majority of the time.  She believes this is causing her hip pain.  Ibuprofen and Tylenol are not helping.      The following portions of the patient's history were reviewed and updated as appropriate: allergies, current medications, past family history, past medical history, past social history, past surgical history and problem list.    Allergies   Allergen Reactions    Topiramate Other (See Comments)     Metallic taste in mouth       Patient Active Problem List   Diagnosis    Continuous leakage of urine    Counseling for birth control regarding intrauterine device (IUD)    Other hemorrhoids    Obesity without serious comorbidity    Physical exam    Anxiety    Umbilical hernia without obstruction and without gangrene    Left leg pain    Paresthesias       Current Outpatient Medications   Medication Instructions    methylPREDNISolone (MEDROL) 4 MG dose pack Take as directed on package instructions.          Objective   Vital Signs:  /93 (BP  "Location: Left arm, Patient Position: Sitting, Cuff Size: Large Adult)   Pulse 76   Temp 98.9 °F (37.2 °C) (Temporal)   Ht 162.6 cm (64.02\")   Wt 92.1 kg (203 lb)   SpO2 98%   BMI 34.83 kg/m²   Estimated body mass index is 34.83 kg/m² as calculated from the following:    Height as of this encounter: 162.6 cm (64.02\").    Weight as of this encounter: 92.1 kg (203 lb).             Review of Systems   Constitutional:  Negative for activity change, chills, fatigue, fever and unexpected weight change.   Cardiovascular:  Negative for leg swelling.   Musculoskeletal:  Positive for arthralgias and back pain. Negative for gait problem, joint swelling, myalgias, neck pain and neck stiffness.   Neurological:  Negative for weakness.        Physical Exam  Constitutional:       Appearance: Normal appearance.   Cardiovascular:      Rate and Rhythm: Normal rate and regular rhythm.   Pulmonary:      Effort: Pulmonary effort is normal.      Breath sounds: Normal breath sounds.   Musculoskeletal:         General: Normal range of motion.      Right elbow: No swelling. Decreased range of motion. Tenderness present.      Right hip: Tenderness present. Decreased range of motion.   Skin:     General: Skin is warm and dry.      Capillary Refill: Capillary refill takes less than 2 seconds.   Neurological:      Mental Status: She is alert and oriented to person, place, and time.   Psychiatric:         Mood and Affect: Mood normal.         Behavior: Behavior normal.         Thought Content: Thought content normal.         Judgment: Judgment normal.        Result Review :                   Assessment and Plan   Diagnoses and all orders for this visit:    1. Right elbow pain (Primary)  Comments:  Will get imaging of both right elbow and right hip.  Ordered steroid pack to relieve any inflammation and pain.  May consider Ortho or PT  Orders:  -     MRI Elbow Right Without Contrast; Future    2. Right hip pain  -     MRI Hip Right Without " Contrast; Future    Other orders  -     methylPREDNISolone (MEDROL) 4 MG dose pack; Take as directed on package instructions.  Dispense: 21 tablet; Refill: 0             Follow Up   No follow-ups on file.  Patient was given instructions and counseling regarding her condition or for health maintenance advice. Please see specific information pulled into the AVS if appropriate.

## 2024-12-09 ENCOUNTER — HOSPITAL ENCOUNTER (OUTPATIENT)
Dept: MAMMOGRAPHY | Facility: HOSPITAL | Age: 44
Discharge: HOME | End: 2024-12-09
Admitting: NURSE PRACTITIONER
Payer: COMMERCIAL

## 2024-12-09 DIAGNOSIS — Z12.31 ENCOUNTER FOR SCREENING MAMMOGRAM FOR MALIGNANT NEOPLASM OF BREAST: ICD-10-CM

## 2024-12-09 PROCEDURE — 77067 SCR MAMMO BI INCL CAD: CPT

## 2024-12-09 PROCEDURE — 77063 BREAST TOMOSYNTHESIS BI: CPT

## 2025-01-16 ENCOUNTER — TELEPHONE (OUTPATIENT)
Dept: FAMILY MEDICINE CLINIC | Facility: CLINIC | Age: 45
End: 2025-01-16

## 2025-01-16 NOTE — TELEPHONE ENCOUNTER
PATIENT CALLED AND STATES SHE RECEIVED CONFIRMATIO FOR APPROVAL FOR MRI OF HIP AND ELBOW. SHE NOW HAS NEW INSURANCE THROUGH [employer]. SHE HAS NEW  NAME ON NEW INSURANCE.   INSURANCE UPDATED IN CHART. NEED TO UPDATE NEW  NAME.     DOES SHE NEED TO COME IN FOR PROOF OF NAME CHANGE. SHE CAN HAVE AN XRAY IF NEEDED TO RESTART APPROVAL    CALL BACK NUMBER [phone]